# Patient Record
Sex: FEMALE | Race: WHITE | NOT HISPANIC OR LATINO | Employment: PART TIME | URBAN - METROPOLITAN AREA
[De-identification: names, ages, dates, MRNs, and addresses within clinical notes are randomized per-mention and may not be internally consistent; named-entity substitution may affect disease eponyms.]

---

## 2018-03-23 ENCOUNTER — HOSPITAL ENCOUNTER (OUTPATIENT)
Facility: MEDICAL CENTER | Age: 58
End: 2018-03-23
Attending: SPECIALIST
Payer: COMMERCIAL

## 2018-03-23 PROCEDURE — 88305 TISSUE EXAM BY PATHOLOGIST: CPT

## 2018-08-14 ENCOUNTER — OFFICE VISIT (OUTPATIENT)
Dept: INTERNAL MEDICINE | Facility: MEDICAL CENTER | Age: 58
End: 2018-08-14
Payer: COMMERCIAL

## 2018-08-14 VITALS
BODY MASS INDEX: 28.66 KG/M2 | HEART RATE: 83 BPM | DIASTOLIC BLOOD PRESSURE: 86 MMHG | OXYGEN SATURATION: 94 % | TEMPERATURE: 96.9 F | WEIGHT: 172 LBS | HEIGHT: 65 IN | SYSTOLIC BLOOD PRESSURE: 152 MMHG

## 2018-08-14 DIAGNOSIS — E78.5 DYSLIPIDEMIA: ICD-10-CM

## 2018-08-14 DIAGNOSIS — M54.41 CHRONIC MIDLINE LOW BACK PAIN WITH RIGHT-SIDED SCIATICA: ICD-10-CM

## 2018-08-14 DIAGNOSIS — Z00.00 ENCOUNTER FOR PREVENTIVE CARE: ICD-10-CM

## 2018-08-14 DIAGNOSIS — R20.2 NUMBNESS AND TINGLING IN BOTH HANDS: ICD-10-CM

## 2018-08-14 DIAGNOSIS — Z13.1 SCREENING FOR DIABETES MELLITUS (DM): ICD-10-CM

## 2018-08-14 DIAGNOSIS — G89.29 CHRONIC MIDLINE LOW BACK PAIN WITH RIGHT-SIDED SCIATICA: ICD-10-CM

## 2018-08-14 DIAGNOSIS — F32.A DEPRESSION, UNSPECIFIED DEPRESSION TYPE: ICD-10-CM

## 2018-08-14 DIAGNOSIS — R20.0 NUMBNESS AND TINGLING IN BOTH HANDS: ICD-10-CM

## 2018-08-14 DIAGNOSIS — Z13.29 SCREENING FOR THYROID DISORDER: ICD-10-CM

## 2018-08-14 DIAGNOSIS — Z71.41 ENCOUNTER FOR ALCOHOLISM COUNSELING: ICD-10-CM

## 2018-08-14 DIAGNOSIS — Z71.6 ENCOUNTER FOR SMOKING CESSATION COUNSELING: ICD-10-CM

## 2018-08-14 PROCEDURE — 99204 OFFICE O/P NEW MOD 45 MIN: CPT | Mod: GC | Performed by: INTERNAL MEDICINE

## 2018-08-14 RX ORDER — FLUOXETINE HYDROCHLORIDE 20 MG/1
20 CAPSULE ORAL DAILY
COMMUNITY
End: 2019-01-22

## 2018-08-14 RX ORDER — NAPROXEN 500 MG/1
500 TABLET ORAL 2 TIMES DAILY WITH MEALS
Qty: 60 TAB | Refills: 3 | Status: SHIPPED | OUTPATIENT
Start: 2018-08-14 | End: 2018-08-14 | Stop reason: SDUPTHER

## 2018-08-14 RX ORDER — NAPROXEN 500 MG/1
500 TABLET ORAL 2 TIMES DAILY WITH MEALS
Qty: 60 TAB | Refills: 3 | Status: SHIPPED | OUTPATIENT
Start: 2018-08-14 | End: 2019-01-22

## 2018-08-14 NOTE — LETTER
[facility]  Randy Elaine M.D.  [address]  [place] [address]  Fax: [phone]   Authorization for Release/Disclosure of   Protected Health Information   Name: GITA GRAVES : 1960 SSN: [id number]   Address: [address] [place] [id number] Phone:    [phone] (home)    I authorize the entity listed below to release/disclose the PHI below to:   [facility]/Randy Elaine M.D. and Randy Elaine M.D.   Provider or Entity Name:                                           Dr. Heather Hutchinson - [facility]     Address   [facility], [facility], [facility]   Phone:      Fax:     Reason for request: continuity of care   Information to be released:    [  ] LAST COLONOSCOPY,  including any PATH REPORT and follow-up  [  ] LAST FIT/COLOGUARD RESULT [  ] LAST DEXA  [  ] LAST MAMMOGRAM  [  ] LAST PAP  [  ] LAST LABS [  ] RETINA EXAM REPORT  [  ] IMMUNIZATION RECORDS  [X] Release all info      [  ] Check here and initial the line next to each item to release ALL health information INCLUDING  _____ Care and treatment for drug and / or alcohol abuse  _____ HIV testing, infection status, or AIDS  _____ Genetic Testing    DATES OF SERVICE OR TIME PERIOD TO BE DISCLOSED: _____________  I understand and acknowledge that:  * This Authorization may be revoked at any time by you in writing, except if your health information has already been used or disclosed.  * Your health information that will be used or disclosed as a result of you signing this authorization could be re-disclosed by the recipient. If this occurs, your re-disclosed health information may no longer be protected by State or Federal laws.  * You may refuse to sign this Authorization. Your refusal will not affect your ability to obtain treatment.  * This Authorization becomes effective upon signing and will  on (date) __________.      If no date is indicated, this Authorization will  one (1) year from the signature date.    Name: Gita  Demar Pack    Signature:   Date:     8/14/2018       PLEASE FAX REQUESTED RECORDS BACK TO: [phone]

## 2018-08-14 NOTE — PATIENT INSTRUCTIONS
Please get your labwork done  Please get all Xray imaging done  Please get mammogram done  Please schedule appointment with [facility] as needed  You can take Naproxen 500mg 2 times daily as needed for pain control      Dyslipidemia  Dyslipidemia is an imbalance of waxy, fat-like substances (lipids) in the blood. The body needs lipids in small amounts. Dyslipidemia often involves a high level of cholesterol or triglycerides, which are types of lipids.  Common forms of dyslipidemia include:  · High levels of bad cholesterol (LDL cholesterol). LDL is the type of cholesterol that causes fatty deposits (plaques) to build up in the blood vessels that carry blood away from your heart (arteries).  · Low levels of good cholesterol (HDL cholesterol). HDL cholesterol is the type of cholesterol that protects against heart disease. High levels of HDL remove the LDL buildup from arteries.  · High levels of triglycerides. Triglycerides are a fatty substance in the blood that is linked to a buildup of plaques in the arteries.  You can develop dyslipidemia because of the genes you are born with (primary dyslipidemia) or changes that occur during your life (secondary dyslipidemia), or as a side effect of certain medical treatments.  What are the causes?  Primary dyslipidemia is caused by changes (mutations) in genes that are passed down through families (inherited). These mutations cause several types of dyslipidemia. Mutations can result in disorders that make the body produce too much LDL cholesterol or triglycerides, or not enough HDL cholesterol. These disorders may lead to heart disease, arterial disease, or stroke at an early age.  Causes of secondary dyslipidemia include certain lifestyle choices and diseases that lead to dyslipidemia, such as:  · Eating a diet that is high in animal fat.  · Not getting enough activity or exercise (having a sedentary lifestyle).  · Having diabetes, kidney disease, liver disease, or  thyroid disease.  · Drinking large amounts of alcohol.  · Using certain types of drugs.  What increases the risk?  You may be at greater risk for dyslipidemia if you are an older man or if you are a woman who has gone through menopause. Other risk factors include:  · Having a family history of dyslipidemia.  · Taking certain medicines, including birth control pills, steroids, some diuretics, beta-blockers, and some medicines for HIV.  · Smoking cigarettes.  · Eating a high-fat diet.  · Drinking large amounts of alcohol.  · Having certain medical conditions such as diabetes, polycystic ovary syndrome (PCOS), pregnancy, kidney disease, liver disease, or hypothyroidism.  · Not exercising regularly.  · Being overweight or obese with too much belly fat.  What are the signs or symptoms?  Dyslipidemia does not usually cause any symptoms.  Very high lipid levels can cause fatty bumps under the skin (xanthomas) or a white or gray ring around the black center (pupil) of the eye. Very high triglyceride levels can cause inflammation of the pancreas (pancreatitis).  How is this diagnosed?  Your health care provider may diagnose dyslipidemia based on a routine blood test (fasting blood test). Because most people do not have symptoms of the condition, this blood testing (lipid profile) is done on adults age 20 and older and is repeated every 5 years. This test checks:  · Total cholesterol. This is a measure of the total amount of cholesterol in your blood, including LDL cholesterol, HDL cholesterol, and triglycerides. A healthy number is below 200.  · LDL cholesterol. The target number for LDL cholesterol is different for each person, depending on individual risk factors. For most people, a number below 100 is healthy. Ask your health care provider what your LDL cholesterol number should be.  · HDL cholesterol. An HDL level of 60 or higher is best because it helps to protect against heart disease. A number below 40 for men or  below 50 for women increases the risk for heart disease.  · Triglycerides. A healthy triglyceride number is below 150.  If your lipid profile is abnormal, your health care provider may do other blood tests to get more information about your condition.  How is this treated?  Treatment depends on the type of dyslipidemia that you have and your other risk factors for heart disease and stroke. Your health care provider will have a target range for your lipid levels based on this information.  For many people, treatment starts with lifestyle changes, such as diet and exercise. Your health care provider may recommend that you:  · Get regular exercise.  · Make changes to your diet.  · Quit smoking if you smoke.  If diet changes and exercise do not help you reach your goals, your health care provider may also prescribe medicine to lower lipids. The most commonly prescribed type of medicine lowers your LDL cholesterol (statin drug). If you have a high triglyceride level, your provider may prescribe another type of drug (fibrate) or an omega-3 fish oil supplement, or both.  Follow these instructions at home:  · Take over-the-counter and prescription medicines only as told by your health care provider. This includes supplements.  · Get regular exercise. Start an aerobic exercise and strength training program as told by your health care provider. Ask your health care provider what activities are safe for you. Your health care provider may recommend:  ¨ 30 minutes of aerobic activity 4-6 days a week. Brisk walking is an example of aerobic activity.  ¨ Strength training 2 days a week.  · Eat a healthy diet as told by your health care provider. This can help you reach and maintain a healthy weight, lower your LDL cholesterol, and raise your HDL cholesterol. It may help to work with a diet and nutrition specialist (dietitian) to make a plan that is right for you. Your dietitian or health care provider may recommend:  ¨ Limiting  your calories, if you are overweight.  ¨ Eating more fruits, vegetables, whole grains, fish, and lean meats.  ¨ Limiting saturated fat, trans fat, and cholesterol.  · Follow instructions from your health care provider or dietitian about eating or drinking restrictions.  · Limit alcohol intake to no more than one drink per day for nonpregnant women and two drinks per day for men. One drink equals 12 oz of beer, 5 oz of wine, or 1½ oz of hard liquor.  · Do not use any products that contain nicotine or tobacco, such as cigarettes and e-cigarettes. If you need help quitting, ask your health care provider.  · Keep all follow-up visits as told by your health care provider. This is important.  Contact a health care provider if:  · You are having trouble sticking to your exercise or diet plan.  · You are struggling to quit smoking or control your use of alcohol.  Summary  · Dyslipidemia is an imbalance of waxy, fat-like substances (lipids) in the blood. The body needs lipids in small amounts. Dyslipidemia often involves a high level of cholesterol or triglycerides, which are types of lipids.  · Treatment depends on the type of dyslipidemia that you have and your other risk factors for heart disease and stroke.  · For many people, treatment starts with lifestyle changes, such as diet and exercise. Your health care provider may also prescribe medicine to lower lipids.  This information is not intended to replace advice given to you by your health care provider. Make sure you discuss any questions you have with your health care provider.  Document Released: 12/23/2014 Document Revised: 08/14/2017 Document Reviewed: 08/14/2017  [employer] Interactive Patient Education © 2017 [employer]    Alcohol Problems  Most adults who drink alcohol drink in moderation (not a lot) are at low risk for developing problems related to their drinking. However, all drinkers, including low-risk drinkers, should know about the health risks  connected with drinking alcohol.  RECOMMENDATIONS FOR LOW-RISK DRINKING   Drink in moderation. Moderate drinking is defined as follows:   · Men - no more than 2 drinks per day.  · Nonpregnant women - no more than 1 drink per day.  · Over age 65 - no more than 1 drink per day.  A standard drink is 12 grams of pure alcohol, which is equal to a 12 ounce bottle of beer or wine cooler, a 5 ounce glass of wine, or 1.5 ounces of distilled spirits (such as whiskey, gopal, vodka, or rum).   ABSTAIN FROM (DO NOT DRINK) ALCOHOL:  · When pregnant or considering pregnancy.  · When taking a medication that interacts with alcohol.  · If you are alcohol dependent.  · A medical condition that prohibits drinking alcohol (such as ulcer, liver disease, or heart disease).  DISCUSS WITH YOUR CAREGIVER:  · If you are at risk for coronary heart disease, discuss the potential benefits and risks of alcohol use: Light to moderate drinking is associated with lower rates of coronary heart disease in certain populations (for example, men over age 45 and postmenopausal women). Infrequent or nondrinkers are advised not to begin light to moderate drinking to reduce the risk of coronary heart disease so as to avoid creating an alcohol-related problem. Similar protective effects can likely be gained through proper diet and exercise.  · Women and the elderly have smaller amounts of body water than men. As a result women and the elderly achieve a higher blood alcohol concentration after drinking the same amount of alcohol.  · Exposing a fetus to alcohol can cause a broad range of birth defects referred to as Fetal Alcohol Syndrome (FAS) or Alcohol-Related Birth Defects (ARBD). Although FAS/ARBD is connected with excessive alcohol consumption during pregnancy, studies also have reported neurobehavioral problems in infants born to mothers reporting drinking an average of 1 drink per day during pregnancy.  · Heavier drinking (the consumption of more than  4 drinks per occasion by men and more than 3 drinks per occasion by women) impairs learning (cognitive) and psychomotor functions and increases the risk of alcohol-related problems, including accidents and injuries.  CAGE QUESTIONS:   · Have you ever felt that you should Cut down on your drinking?  · Have people Annoyed you by criticizing your drinking?  · Have you ever felt bad or Guilty about your drinking?  · Have you ever had a drink first thing in the morning to steady your nerves or get rid of a hangover (Eye opener)?  If you answered positively to any of these questions: You may be at risk for alcohol-related problems if alcohol consumption is:   · Men: Greater than 14 drinks per week or more than 4 drinks per occasion.  · Women: Greater than 7 drinks per week or more than 3 drinks per occasion.  Do you or your family have a medical history of alcohol-related problems, such as:  · Blackouts.  · Sexual dysfunction.  · Depression.  · Trauma.  · Liver dysfunction.  · Sleep disorders.  · Hypertension.  · Chronic abdominal pain.  · Has your drinking ever caused you problems, such as problems with your family, problems with your work (or school) performance, or accidents/injuries?  · Do you have a compulsion to drink or a preoccupation with drinking?  · Do you have poor control or are you unable to stop drinking once you have started?  · Do you have to drink to avoid withdrawal symptoms?  · Do you have problems with withdrawal such as tremors, nausea, sweats, or mood disturbances?  · Does it take more alcohol than in the past to get you high?  · Do you feel a strong urge to drink?  · Do you change your plans so that you can have a drink?  · Do you ever drink in the morning to relieve the shakes or a hangover?  If you have answered a number of the previous questions positively, it may be time for you to talk to your caregivers, family, and friends and see if they think you have a problem. Alcoholism is a chemical  dependency that keeps getting worse and will eventually destroy your health and relationships. Many alcoholics end up dead, impoverished, or in residential. This is often the end result of all chemical dependency.  · Do not be discouraged if you are not ready to take action immediately.  · Decisions to change behavior often involve up and down desires to change and feeling like you cannot decide.  · Try to think more seriously about your drinking behavior.  · Think of the reasons to quit.  WHERE TO GO FOR ADDITIONAL INFORMATION   · The National [place] on Alcohol Abuse and Alcoholism (NIAAA)  www.niaaa.nih.gov  · National [place] on Alcoholism and Drug Dependence (NCADD)  www.ncadd.org  · American Society of Addiction Medicine (ASAM)  www.asam.org   Document Released: 12/18/2006 Document Revised: 03/11/2013 Document Reviewed: 08/05/2009  [employer]® Patient Information ©2014 [employer].      Steps to Quit Smoking  Smoking tobacco can be bad for your health. It can also affect almost every organ in your body. Smoking puts you and people around you at risk for many serious long-lasting (chronic) diseases. Quitting smoking is hard, but it is one of the best things that you can do for your health. It is never too late to quit.  What are the benefits of quitting smoking?  When you quit smoking, you lower your risk for getting serious diseases and conditions. They can include:  · Lung cancer or lung disease.  · Heart disease.  · Stroke.  · Heart attack.  · Not being able to have children (infertility).  · Weak bones (osteoporosis) and broken bones (fractures).  If you have coughing, wheezing, and shortness of breath, those symptoms may get better when you quit. You may also get sick less often. If you are pregnant, quitting smoking can help to lower your chances of having a baby of low birth weight.  What can I do to help me quit smoking?  Talk with your doctor about what can help you quit smoking. Some things you can do  (strategies) include:  · Quitting smoking totally, instead of slowly cutting back how much you smoke over a period of time.  · Going to in-person counseling. You are more likely to quit if you go to many counseling sessions.  · Using resources and support systems, such as:  ¨ Online chats with a counselor.  ¨ Phone quitlines.  ¨ Printed self-help materials.  ¨ Support groups or group counseling.  ¨ Text messaging programs.  ¨ Mobile phone apps or applications.  · Taking medicines. Some of these medicines may have nicotine in them. If you are pregnant or breastfeeding, do not take any medicines to quit smoking unless your doctor says it is okay. Talk with your doctor about counseling or other things that can help you.  Talk with your doctor about using more than one strategy at the same time, such as taking medicines while you are also going to in-person counseling. This can help make quitting easier.  What things can I do to make it easier to quit?  Quitting smoking might feel very hard at first, but there is a lot that you can do to make it easier. Take these steps:  · Talk to your family and friends. Ask them to support and encourage you.  · Call phone quitlines, reach out to support groups, or work with a counselor.  · Ask people who smoke to not smoke around you.  · Avoid places that make you want (trigger) to smoke, such as:  ¨ Bars.  ¨ Parties.  ¨ Smoke-break areas at work.  · Spend time with people who do not smoke.  · Lower the stress in your life. Stress can make you want to smoke. Try these things to help your stress:  ¨ Getting regular exercise.  ¨ Deep-breathing exercises.  ¨ Yoga.  ¨ Meditating.  ¨ Doing a body scan. To do this, close your eyes, focus on one area of your body at a time from head to toe, and notice which parts of your body are tense. Try to relax the muscles in those areas.  · Download or buy apps on your mobile phone or tablet that can help you stick to your quit plan. There are many  free apps, such as QuitGuide from the CDC (Centers for Disease Control and Prevention). You can find more support from smokefree.gov and other websites.  This information is not intended to replace advice given to you by your health care provider. Make sure you discuss any questions you have with your health care provider.  Document Released: 10/14/2010 Document Revised: 08/15/2017 Document Reviewed: 05/03/2016  [employer] Interactive Patient Education © 2017 [employer]

## 2018-08-15 NOTE — PROGRESS NOTES
New Patient to Establish    Reason to establish: New patient to establish    CC:   Patient is here to establish care with new primary care provider  She would also like to discuss her lower back pain    HPI:   Patient is a 57-year-old female who arrives to establish care with new primary care provider, and also discuss her lower back pain and numbness and tingling in her hands.  Patient states that she has had lower back pain for 4 years and states that the pain is located in her lower back and hips and started out of nowhere.  Patient states that this pain might be due to her overzealous work ethic.  Patient describes electricity like pain into her right leg especially when she stands and states that this is new for the past 3 months.  Patient states that more often this electricity like pain is shooting down from her right knee.  Patient states that she has been taking ibuprofen 800 mg every other day for her back pain at this time.      Patient also states numbness and tingling in her fingers (right hand first 3 fingers) and sometimes in both hands and arms.  Patient states that this is been going on for 6 months now    She states that she is not good at taking pills and has not been compliant with her hyperlipidemia medications simvastatin since 2014 and also states that she is not good with her diet.  She states that last year she was prescribed Prozac for her depression which she has not been taking.    In terms of social history patient states that she smokes 1 pack per day since age 30, denies any illicit drug use, and states that she drinks 3 drinks which are mixed with vodka daily.  Terms of cage questioning patient states she does want to cut back, others do not feel annoyed or she does not feel annoyed from criticism, she does feel guilty, and does not require an eye opener. So 2 out of 4 cage questions were positive.  Patient states that in terms of sexual history, she is not currently sexually active,  and that 2 years ago there were some growths observed in her genital area and since then she has been seeing gynecology every 4 months.  She cannot recall being tested for HIV.  In terms her OB/GYN history, patient states she had a hysterectomy with cervix removed, and her last mammogram was 2013.  In terms of preventive screening, patient states her last colonoscopy was in 2010, her last Tdap was in June 2018.      There are no active problems to display for this patient.      Past Medical History:   Diagnosis Date   • Hypercholesterolemia        Current Outpatient Prescriptions   Medication Sig Dispense Refill   • FLUoxetine (PROZAC) 20 MG Cap Take 20 mg by mouth every day.     • nicotine polacrilex (NICORETTE) 2 MG Gum Take 1 Each by mouth as needed for Smoking Cessation. 30 Each 3   • naproxen (NAPROSYN) 500 MG Tab Take 1 Tab by mouth 2 times a day, with meals. 60 Tab 3   • simvastatin (ZOCOR) 40 MG TABS Take 20 mg by mouth every evening.     • cyclobenzaprine (FLEXERIL) 10 MG TABS Take 1 Tab by mouth 3 times a day as needed for Mild Pain. 30 Tab 0     No current facility-administered medications for this visit.        Allergies as of 08/14/2018   • (No Known Allergies)       Social History     Social History   • Marital status:      Spouse name: N/A   • Number of children: N/A   • Years of education: N/A     Occupational History   • Not on file.     Social History Main Topics   • Smoking status: Current Every Day Smoker     Packs/day: 1.00     Years: 40.00     Types: Cigarettes   • Smokeless tobacco: Never Used   • Alcohol use Yes      Comment: 3 drinks (mixed drinks with vodka)/day in evening    • Drug use: No   • Sexual activity: No     Other Topics Concern   • Not on file     Social History Narrative   • No narrative on file       Family History   Problem Relation Age of Onset   • Hyperlipidemia Mother    • Diabetes Mother    • Diabetes Father    • Hyperlipidemia Father    • Heart Disease Father    •  "Cancer Maternal Grandmother    • Hyperlipidemia Maternal Grandmother        Past Surgical History:   Procedure Laterality Date   • ABDOMINAL HYSTERECTOMY TOTAL  5/29/08    Performed by JAHAIRA HOLLINGSWORTH at SURGERY SAME DAY [facility] ORS   • CYSTOSCOPY  5/29/08    Performed by JAHAIRA HOLLINGSWORTH at SURGERY SAME DAY [facility] ORS   • BREAST BIOPSY      hx of right benign breast biopsy       ROS: As per HPI. Additional pertinent symptoms as noted below.    Patient states a cough but due to smoking and allergies sometimes productive with white yellow sputum  All others negative    /86   Pulse 83   Temp 36.1 °C (96.9 °F)   Ht 1.651 m (5' 5\")   Wt 78 kg (172 lb)   SpO2 94%   BMI 28.62 kg/m²     Physical Exam  General:  Alert and oriented, No apparent distress.    Eyes: Pupils equal and reactive. No scleral icterus.    Throat: Clear no erythema or exudates noted.    Neck: Supple. No lymphadenopathy noted. Thyroid not enlarged.    Lungs: Clear to auscultation and percussion bilaterally.    Cardiovascular: Regular rate and rhythm. No murmurs, rubs or gallops.    Abdomen:  Benign. No rebound or guarding noted.    Extremities: No clubbing, cyanosis, edema.  2+ bilateral distal pulses bilaterally.    Skin: Clear. No rash or suspicious skin lesions noted.    Musculoskeletal: Lower lumbar L4-L5 nontender to palpation, straight leg raise test bilaterally negative, gait observed was normal, sensation intact bilaterally in upper and lower extremities.       Assessment and Plan    1. Chronic midline low back pain with right-sided sciatica  Lower back pain for 4 years per patient.  This is likely degenerative arthritis/osteoarthritis versus myelopathic versus radicular involvement presentation; degenerative arthritis/osteoarthritis more likely  Patient states he goes to chiropractor for her back and this helps  -X-ray of the cervical spine, and x-ray of the thoracic spine, and x-ray of the lumbar spine ordered  -Will follow-up at " next office visit and based on x-ray results may refer to physical therapy if necessary  -Patient prescribed Naproxen 500mg 2 times daily as needed for pain control  -We will consider adding extra strength acetaminophen at next visit based on the left E lab work results      2. Numbness and tingling in both hands  Numbness and tingling in fingers of both hands for past 6 months  This is likely secondary to possible nerve impingement in cervical spine versus undiagnosed diabetic symptoms  -X-ray of the cervical spine, and x-ray of the thoracic spine, and x-ray of the lumbar spine ordered  -Will follow-up at next office visit      3. Dyslipidemia  Patient was prescribed simvastatin for hyperlipidemia in the past but has not taken this medication since 2014 and also states her diet is very poor  -Lab work lipid panel ordered  -Will follow up at next office visit      4. Depression, unspecified depression type  Patient states that she was prescribed Prozac for depression in the past but not currently taking  -PHQ 9 was performed in the clinic and patient scored a 6  -No further intervention at this time will continue to monitor      5. Encounter for smoking cessation counseling  Patient verbalizes interest to quit smoking and was counseled on cessation  -Patient agrees to try nicotine gum  -Patient was also provided cessation materials      6. Encounter for alcoholism counseling  Patient worse reports extensive alcohol use 3 drinks (mixed with vodka) per day  -Patient was counseled regarding alcohol cessation  -Patient verbalizes need to quit back and is making efforts to cut back  -Labwork CMP ordered to check LFTs  -Will follow up at next office visit in continue to monitor      7. Encounter for preventive care  Patient reports never receiving a mammogram  -Patient provided order for mammogram  -HIV antigen antibody screen ordered      8. Screening for diabetes mellitus (DM)  Patient does not have any recent baseline  studies  -Labwork fasting CMP ordered  -Will follow up at next office visit      9. Screening for thyroid disorder  Patient does not have any recent baseline studies  -Labwork TSH reflex to T4 ordered  -Will follow up at next office visit          Signed by: Randy Elaine M.D.

## 2018-08-21 ENCOUNTER — HOSPITAL ENCOUNTER (OUTPATIENT)
Dept: RADIOLOGY | Facility: MEDICAL CENTER | Age: 58
End: 2018-08-21
Attending: STUDENT IN AN ORGANIZED HEALTH CARE EDUCATION/TRAINING PROGRAM
Payer: COMMERCIAL

## 2018-08-21 ENCOUNTER — HOSPITAL ENCOUNTER (OUTPATIENT)
Dept: LAB | Facility: MEDICAL CENTER | Age: 58
End: 2018-08-21
Attending: STUDENT IN AN ORGANIZED HEALTH CARE EDUCATION/TRAINING PROGRAM
Payer: COMMERCIAL

## 2018-08-21 DIAGNOSIS — E78.5 DYSLIPIDEMIA: ICD-10-CM

## 2018-08-21 DIAGNOSIS — R20.0 NUMBNESS AND TINGLING IN BOTH HANDS: ICD-10-CM

## 2018-08-21 DIAGNOSIS — Z13.1 SCREENING FOR DIABETES MELLITUS (DM): ICD-10-CM

## 2018-08-21 DIAGNOSIS — R20.2 NUMBNESS AND TINGLING IN BOTH HANDS: ICD-10-CM

## 2018-08-21 DIAGNOSIS — Z13.29 SCREENING FOR THYROID DISORDER: ICD-10-CM

## 2018-08-21 DIAGNOSIS — Z00.00 ENCOUNTER FOR PREVENTIVE CARE: ICD-10-CM

## 2018-08-21 DIAGNOSIS — G89.29 CHRONIC MIDLINE LOW BACK PAIN WITH RIGHT-SIDED SCIATICA: ICD-10-CM

## 2018-08-21 DIAGNOSIS — M54.41 CHRONIC MIDLINE LOW BACK PAIN WITH RIGHT-SIDED SCIATICA: ICD-10-CM

## 2018-08-21 LAB
ALBUMIN SERPL BCP-MCNC: 4.7 G/DL (ref 3.2–4.9)
ALBUMIN/GLOB SERPL: 1.5 G/DL
ALP SERPL-CCNC: 87 U/L (ref 30–99)
ALT SERPL-CCNC: 49 U/L (ref 2–50)
ANION GAP SERPL CALC-SCNC: 8 MMOL/L (ref 0–11.9)
AST SERPL-CCNC: 44 U/L (ref 12–45)
BASOPHILS # BLD AUTO: 0.6 % (ref 0–1.8)
BASOPHILS # BLD: 0.05 K/UL (ref 0–0.12)
BILIRUB SERPL-MCNC: 0.5 MG/DL (ref 0.1–1.5)
BUN SERPL-MCNC: 16 MG/DL (ref 8–22)
CALCIUM SERPL-MCNC: 10 MG/DL (ref 8.5–10.5)
CHLORIDE SERPL-SCNC: 105 MMOL/L (ref 96–112)
CHOLEST SERPL-MCNC: 274 MG/DL (ref 100–199)
CO2 SERPL-SCNC: 28 MMOL/L (ref 20–33)
CREAT SERPL-MCNC: 0.63 MG/DL (ref 0.5–1.4)
EOSINOPHIL # BLD AUTO: 0.09 K/UL (ref 0–0.51)
EOSINOPHIL NFR BLD: 1.1 % (ref 0–6.9)
ERYTHROCYTE [DISTWIDTH] IN BLOOD BY AUTOMATED COUNT: 44 FL (ref 35.9–50)
GLOBULIN SER CALC-MCNC: 3.1 G/DL (ref 1.9–3.5)
GLUCOSE SERPL-MCNC: 110 MG/DL (ref 65–99)
HCT VFR BLD AUTO: 50.2 % (ref 37–47)
HDLC SERPL-MCNC: 81 MG/DL
HGB BLD-MCNC: 17.4 G/DL (ref 12–16)
HIV 1+2 AB+HIV1 P24 AG SERPL QL IA: NON REACTIVE
IMM GRANULOCYTES # BLD AUTO: 0.03 K/UL (ref 0–0.11)
IMM GRANULOCYTES NFR BLD AUTO: 0.4 % (ref 0–0.9)
LDLC SERPL CALC-MCNC: 173 MG/DL
LYMPHOCYTES # BLD AUTO: 2.41 K/UL (ref 1–4.8)
LYMPHOCYTES NFR BLD: 29.8 % (ref 22–41)
MCH RBC QN AUTO: 32.6 PG (ref 27–33)
MCHC RBC AUTO-ENTMCNC: 34.7 G/DL (ref 33.6–35)
MCV RBC AUTO: 94 FL (ref 81.4–97.8)
MONOCYTES # BLD AUTO: 0.53 K/UL (ref 0–0.85)
MONOCYTES NFR BLD AUTO: 6.5 % (ref 0–13.4)
NEUTROPHILS # BLD AUTO: 4.99 K/UL (ref 2–7.15)
NEUTROPHILS NFR BLD: 61.6 % (ref 44–72)
NRBC # BLD AUTO: 0 K/UL
NRBC BLD-RTO: 0 /100 WBC
PLATELET # BLD AUTO: 214 K/UL (ref 164–446)
PMV BLD AUTO: 10.5 FL (ref 9–12.9)
POTASSIUM SERPL-SCNC: 4 MMOL/L (ref 3.6–5.5)
PROT SERPL-MCNC: 7.8 G/DL (ref 6–8.2)
RBC # BLD AUTO: 5.34 M/UL (ref 4.2–5.4)
SODIUM SERPL-SCNC: 141 MMOL/L (ref 135–145)
TRIGL SERPL-MCNC: 98 MG/DL (ref 0–149)
TSH SERPL DL<=0.005 MIU/L-ACNC: 1.64 UIU/ML (ref 0.38–5.33)
WBC # BLD AUTO: 8.1 K/UL (ref 4.8–10.8)

## 2018-08-21 PROCEDURE — 72072 X-RAY EXAM THORAC SPINE 3VWS: CPT

## 2018-08-21 PROCEDURE — 72040 X-RAY EXAM NECK SPINE 2-3 VW: CPT

## 2018-08-21 PROCEDURE — 85025 COMPLETE CBC W/AUTO DIFF WBC: CPT

## 2018-08-21 PROCEDURE — 87389 HIV-1 AG W/HIV-1&-2 AB AG IA: CPT

## 2018-08-21 PROCEDURE — 80053 COMPREHEN METABOLIC PANEL: CPT

## 2018-08-21 PROCEDURE — 80061 LIPID PANEL: CPT

## 2018-08-21 PROCEDURE — 84443 ASSAY THYROID STIM HORMONE: CPT

## 2018-08-21 PROCEDURE — 72100 X-RAY EXAM L-S SPINE 2/3 VWS: CPT

## 2018-08-21 PROCEDURE — 36415 COLL VENOUS BLD VENIPUNCTURE: CPT

## 2018-08-31 ENCOUNTER — HOSPITAL ENCOUNTER (OUTPATIENT)
Dept: RADIOLOGY | Facility: MEDICAL CENTER | Age: 58
End: 2018-08-31
Attending: STUDENT IN AN ORGANIZED HEALTH CARE EDUCATION/TRAINING PROGRAM
Payer: COMMERCIAL

## 2018-08-31 DIAGNOSIS — Z00.00 ENCOUNTER FOR PREVENTIVE CARE: ICD-10-CM

## 2018-08-31 PROCEDURE — 77067 SCR MAMMO BI INCL CAD: CPT

## 2019-01-22 ENCOUNTER — TELEPHONE (OUTPATIENT)
Dept: INTERNAL MEDICINE | Facility: MEDICAL CENTER | Age: 59
End: 2019-01-22

## 2019-01-22 ENCOUNTER — OFFICE VISIT (OUTPATIENT)
Dept: INTERNAL MEDICINE | Facility: MEDICAL CENTER | Age: 59
End: 2019-01-22
Payer: COMMERCIAL

## 2019-01-22 VITALS
TEMPERATURE: 97.3 F | WEIGHT: 183 LBS | OXYGEN SATURATION: 98 % | DIASTOLIC BLOOD PRESSURE: 92 MMHG | HEART RATE: 80 BPM | SYSTOLIC BLOOD PRESSURE: 142 MMHG | HEIGHT: 65 IN | BODY MASS INDEX: 30.49 KG/M2

## 2019-01-22 DIAGNOSIS — L02.429 CARBUNCLE AND FURUNCLE OF LOWER EXTREMITY: ICD-10-CM

## 2019-01-22 DIAGNOSIS — L02.439 CARBUNCLE AND FURUNCLE OF LOWER EXTREMITY: ICD-10-CM

## 2019-01-22 DIAGNOSIS — Z71.6 ENCOUNTER FOR SMOKING CESSATION COUNSELING: ICD-10-CM

## 2019-01-22 DIAGNOSIS — Z00.00 ENCOUNTER FOR PREVENTIVE CARE: ICD-10-CM

## 2019-01-22 DIAGNOSIS — E78.5 DYSLIPIDEMIA: ICD-10-CM

## 2019-01-22 PROCEDURE — 90732 PPSV23 VACC 2 YRS+ SUBQ/IM: CPT | Performed by: INTERNAL MEDICINE

## 2019-01-22 PROCEDURE — 90471 IMMUNIZATION ADMIN: CPT | Performed by: INTERNAL MEDICINE

## 2019-01-22 PROCEDURE — 99214 OFFICE O/P EST MOD 30 MIN: CPT | Mod: GC,25 | Performed by: INTERNAL MEDICINE

## 2019-01-22 RX ORDER — ATORVASTATIN CALCIUM 20 MG/1
40 TABLET, FILM COATED ORAL DAILY
Qty: 30 TAB | Refills: 3 | Status: SHIPPED | OUTPATIENT
Start: 2019-01-22 | End: 2020-10-20

## 2019-01-22 RX ORDER — SULFAMETHOXAZOLE AND TRIMETHOPRIM 800; 160 MG/1; MG/1
2 TABLET ORAL 2 TIMES DAILY
Qty: 28 TAB | Refills: 0 | Status: SHIPPED | OUTPATIENT
Start: 2019-01-22 | End: 2019-01-29

## 2019-01-22 RX ORDER — SULFAMETHOXAZOLE AND TRIMETHOPRIM 800; 160 MG/1; MG/1
1 TABLET ORAL 2 TIMES DAILY
Qty: 14 TAB | Refills: 0 | Status: SHIPPED | OUTPATIENT
Start: 2019-01-22 | End: 2019-01-22

## 2019-01-22 ASSESSMENT — PAIN SCALES - GENERAL: PAINLEVEL: 1=MINIMAL PAIN

## 2019-01-22 ASSESSMENT — PATIENT HEALTH QUESTIONNAIRE - PHQ9: CLINICAL INTERPRETATION OF PHQ2 SCORE: 0

## 2019-01-22 NOTE — NON-PROVIDER
"Gita Pack is a 58 y.o. female here for a non-provider visit for:   PNEUMOVAX (PPSV23)    Reason for immunization: Overdue/Provider Recommended  Immunization records indicate need for vaccine: Yes, confirmed with Epic  Minimum interval has been met for this vaccine: Yes  ABN completed: Not Indicated    Order and dose verified by: JAYLA  VIS Dated  4/24/15 was given to patient: Yes  All IAC Questionnaire questions were answered \"No.\"    Patient tolerated injection and no adverse effects were observed or reported: Yes    Pt scheduled for next dose in series: Not Indicated      "

## 2019-01-22 NOTE — PATIENT INSTRUCTIONS
- Please begin taking Bactrim double strength antibiotic two times daily for 7 days  - Use mupirocin topical antibiotic cream to apply to infected area two times daily after washing  - use moist/heat compresses to help with drainage  - If signs or symptoms worsen and you develop fevers or chills or worsening pain, then go to emergency department  - Start taking Atorvastatin 40 mg daily for high cholesterol  - Use [employer] surgical scrub as need for cleaning [email or web address]  - Follow-up appointment after one week after completing antibiotics     Abscess  Care After  An abscess (also called a boil or furuncle) is an infected area that contains a collection of pus. Signs and symptoms of an abscess include pain, tenderness, redness, or hardness, or you may feel a moveable soft area under your skin. An abscess can occur anywhere in the body. The infection may spread to surrounding tissues causing cellulitis. A cut (incision) by the surgeon was made over your abscess and the pus was drained out. Gauze may have been packed into the space to provide a drain that will allow the cavity to heal from the inside outwards. The boil may be painful for 5 to 7 days. Most people with a boil do not have high fevers. Your abscess, if seen early, may not have localized, and may not have been lanced. If not, another appointment may be required for this if it does not get better on its own or with medications.  HOME CARE INSTRUCTIONS   · Only take over-the-counter or prescription medicines for pain, discomfort, or fever as directed by your caregiver.  · When you bathe, soak and then remove gauze or iodoform packs at least daily or as directed by your caregiver. You may then wash the wound gently with mild soapy water. Repack with gauze or do as your caregiver directs.  SEEK IMMEDIATE MEDICAL CARE IF:   · You develop increased pain, swelling, redness, drainage, or bleeding in the wound site.  · You develop signs of  generalized infection including muscle aches, chills, fever, or a general ill feeling.  · An oral temperature above 102° F (38.9° C) develops, not controlled by medication.  See your caregiver for a recheck if you develop any of the symptoms described above. If medications (antibiotics) were prescribed, take them as directed.  Document Released: 07/06/2006 Document Revised: 03/11/2013 Document Reviewed: 03/02/2009  [employer]® Patient Information ©2014 [employer].

## 2019-01-22 NOTE — PROGRESS NOTES
Established Patient    Gita presents today with the following:    CC:   Patient presents to go over lab work and also discuss a right thigh carbuncle worsening for the past week    HPI:     Patient is a 58-year-old female who presents to [facility] clinic, to follow-up and go over her lab work and also discuss a right thigh carbuncle which has been worsening for the past week.      Patient's labs were discussed, lipid profile from 8/28/2018 revealed total cholesterol 274, triglycerides 98, HDL 81, , patient is a current smoker, her ASCVD risk was calculated to be 8.1% and a moderate to high intensity statin is recommended due to her risk factors at this time.    In terms of the right thigh carbuncle, patient states that otherwise this carbuncle has been hard and present for the past 8 months however recently in the past week it is opened and her skin appears to have infected, it was oozing with purulence, no bleeding, was tender when she touched the site, denies any fevers, chills, nausea, vomiting, diarrhea, constipation, any recent illnesses, any history of MRSA infection in the past.  She states that she has been using warm water compresses to help soften the skin about 2-3 times per day in addition to antibacterial soap and states that it is looking a lot better than it was before.    In terms of smoking cessation, patient states she is still smoking about 1 pack/day which she has been doing for the past 40 years and is willing to try and patch at this time to help with quitting.    In terms of preventive measures, patient consents to influenza vaccination at this time in addition to pneumonia vaccination Pneumovax PPSV 23, which is indicated for her because she is a current smoker.    There are no active problems to display for this patient.      Current Outpatient Prescriptions   Medication Sig Dispense Refill   • nicotine (NICODERM) 7 MG/24HR PATCH 24 HR Apply 1 Patch to skin as directed every 24 hours. 30  "Patch 3   • atorvastatin (LIPITOR) 20 MG Tab Take 2 Tabs by mouth every day. 30 Tab 3   • sulfamethoxazole-trimethoprim (BACTRIM DS) 800-160 MG tablet Take 1 Tab by mouth 2 times a day for 7 days. 14 Tab 0   • mupirocin (BACTROBAN) 2 % Ointment Apply 1 Application to affected area(s) 2 times a day. 1 Tube 1     No current facility-administered medications for this visit.        Social History     Social History   • Marital status:      Spouse name: N/A   • Number of children: N/A   • Years of education: N/A     Occupational History   • Not on file.     Social History Main Topics   • Smoking status: Current Every Day Smoker     Packs/day: 1.00     Years: 40.00     Types: Cigarettes   • Smokeless tobacco: Never Used   • Alcohol use Yes      Comment: 3 drinks (mixed drinks with vodka)/day in evening    • Drug use: No   • Sexual activity: No     Other Topics Concern   • Not on file     Social History Narrative   • No narrative on file       Family History   Problem Relation Age of Onset   • Hyperlipidemia Mother    • Diabetes Mother    • Diabetes Father    • Hyperlipidemia Father    • Heart Disease Father    • Cancer Maternal Grandmother    • Hyperlipidemia Maternal Grandmother        ROS: As per HPI.  All other systems reviewed and were negative.  Additional pertinent symptoms as noted below.    States that otherwise this carbuncle has been hard and present for the past 8 months however recently in the past week it is opened and her skin appears to have infected, it was oozing with purulence, no bleeding, was tender when she touched the site, denies any fevers, chills, nausea, vomiting, diarrhea, constipation, any recent illnesses, any history of MRSA infection in the past.   All others negative    /92 (BP Location: Left arm, Patient Position: Sitting, BP Cuff Size: Adult)   Pulse 80   Temp 36.3 °C (97.3 °F) (Temporal)   Ht 1.651 m (5' 5\")   Wt 83 kg (183 lb)   SpO2 98%   Breastfeeding? No   BMI " 30.45 kg/m²     Physical Exam  General:  Alert and oriented, No apparent distress.    Eyes: Pupils equal and reactive. No scleral icterus.    Throat: Clear no erythema or exudates noted.    Neck: Supple. No lymphadenopathy noted. Thyroid not enlarged.    Lungs: Clear to auscultation and percussion bilaterally.  Normal effort of breathing    Cardiovascular: Regular rate and rhythm. No murmurs, rubs or gallops.    Abdomen:  Benign. No rebound or guarding noted.    Extremities: No clubbing, cyanosis, edema.  Distal peripheral pulses 2+ bilaterally in all extremities.    Skin: Clear. No rash or suspicious skin lesions noted.  On right lower extremity, on right thigh anterior a carbuncle which is raised and indurated with open wound site, purulent drainage appreciated, tender to palpation, erythematous around wound site, no lymphatic streaking visualized at this time.        Assessment and Plan    1. Carbuncle and furuncle of lower extremity  -  Right thigh carbuncle which has been worsening for the past week  - On physical examination, on right lower extremity, on right thigh anterior a carbuncle which is raised and indurated with open wound site, purulent drainage appreciated, tender to palpation, erythematous around wound site, no lymphatic streaking visualized at this time.  - Patient's clinical presentation is suspicious for MRSA infection at this time, despite being afebrile no fevers or chills, important to address this as MRSA infection at this time  - CULTURE WOUND W/ GRAM STAIN  - CULTURE WOUND W/ GRAM STAIN  - To be started on Bactrim double strength antibiotics twice daily for 7 days  - Given mupirocin topical antibiotic cream to apply to area 2 times daily after washing  - Urgent referral to wound clinic for further workup and evaluation and debridement if necessary  - To utilize warm compresses and sterile cleansing methods such as Hibiclens provided information and wrap  - Will follow-up wound culture for  culture and sensitivity results and titrate antibiotic if necessary  - To follow-up after 1 week of completing antibiotic      2. Dyslipidemia  - Recent labs were discussed, lipid profile from 8/28/2018 revealed total cholesterol 274, triglycerides 98, HDL 81,   - Patient is a current smoker  - ASCVD 10 year risk was calculated to be 8.1% and a moderate to high intensity statin is recommended due to her risk factors at this time  -She is to be started on atorvastatin 40 mg daily at this time  - Will continue to monitor      3. Encounter for smoking cessation counseling  - States she is still smoking about 1 pack/day which she has been doing for the past 40 years and is willing to try and patch at this time to help with quitting  - Nicotine patch provided to patient for smoking cessation  - Will continue to monitor      4. Encounter for preventive care  -  Patient consents to influenza vaccination at this time in addition to pneumonia vaccination Pneumovax PPSV 23, which is indicated for her because she is a current smoker  - Provided influenza vaccination and Pneumovax vaccination PPSV 23 at office visit today      Signed by: Randy Elaine M.D.

## 2019-01-22 NOTE — TELEPHONE ENCOUNTER
Spoke to patient over the phone around 1 PM on 1/2/2019 regarding Bactrim antibiotic to take double strength 2 tablets twice daily for next 7 days (that new order has been sent to pharmacy), patient expressed verbal understanding states she will pick this up today.  She was informed that if her signs and symptoms worsen or she develops any fevers or chills to go to the emergency department as this can be signs of worsening infection.

## 2019-01-31 ENCOUNTER — OFFICE VISIT (OUTPATIENT)
Dept: INTERNAL MEDICINE | Facility: MEDICAL CENTER | Age: 59
End: 2019-01-31
Payer: COMMERCIAL

## 2019-01-31 VITALS
SYSTOLIC BLOOD PRESSURE: 128 MMHG | BODY MASS INDEX: 30.16 KG/M2 | OXYGEN SATURATION: 98 % | HEIGHT: 65 IN | WEIGHT: 181 LBS | TEMPERATURE: 98.5 F | DIASTOLIC BLOOD PRESSURE: 78 MMHG | HEART RATE: 94 BPM

## 2019-01-31 DIAGNOSIS — M54.2 NECK PAIN: ICD-10-CM

## 2019-01-31 DIAGNOSIS — L02.429 CARBUNCLE AND FURUNCLE OF LEG: ICD-10-CM

## 2019-01-31 DIAGNOSIS — L02.439 CARBUNCLE AND FURUNCLE OF LEG: ICD-10-CM

## 2019-01-31 DIAGNOSIS — Z23 NEED FOR VACCINATION: ICD-10-CM

## 2019-01-31 PROCEDURE — 90471 IMMUNIZATION ADMIN: CPT | Performed by: INTERNAL MEDICINE

## 2019-01-31 PROCEDURE — 90686 IIV4 VACC NO PRSV 0.5 ML IM: CPT | Performed by: INTERNAL MEDICINE

## 2019-01-31 PROCEDURE — 99213 OFFICE O/P EST LOW 20 MIN: CPT | Mod: GE,25 | Performed by: INTERNAL MEDICINE

## 2019-01-31 RX ORDER — ATORVASTATIN CALCIUM 40 MG/1
TABLET, FILM COATED ORAL
COMMUNITY
Start: 2019-01-22 | End: 2020-10-20

## 2019-01-31 RX ORDER — IBUPROFEN 600 MG/1
600 TABLET ORAL EVERY 6 HOURS PRN
Qty: 30 TAB | Refills: 0 | Status: SHIPPED | OUTPATIENT
Start: 2019-01-31 | End: 2020-10-19 | Stop reason: SDUPTHER

## 2019-01-31 NOTE — PATIENT INSTRUCTIONS
Abscess  Care After  An abscess (also called a boil or furuncle) is an infected area that contains a collection of pus. Signs and symptoms of an abscess include pain, tenderness, redness, or hardness, or you may feel a moveable soft area under your skin. An abscess can occur anywhere in the body. The infection may spread to surrounding tissues causing cellulitis. A cut (incision) by the surgeon was made over your abscess and the pus was drained out. Gauze may have been packed into the space to provide a drain that will allow the cavity to heal from the inside outwards. The boil may be painful for 5 to 7 days. Most people with a boil do not have high fevers. Your abscess, if seen early, may not have localized, and may not have been lanced. If not, another appointment may be required for this if it does not get better on its own or with medications.  HOME CARE INSTRUCTIONS   · Only take over-the-counter or prescription medicines for pain, discomfort, or fever as directed by your caregiver.  · When you bathe, soak and then remove gauze or iodoform packs at least daily or as directed by your caregiver. You may then wash the wound gently with mild soapy water. Repack with gauze or do as your caregiver directs.  SEEK IMMEDIATE MEDICAL CARE IF:   · You develop increased pain, swelling, redness, drainage, or bleeding in the wound site.  · You develop signs of generalized infection including muscle aches, chills, fever, or a general ill feeling.  · An oral temperature above 102° F (38.9° C) develops, not controlled by medication.  See your caregiver for a recheck if you develop any of the symptoms described above. If medications (antibiotics) were prescribed, take them as directed.  Document Released: 07/06/2006 Document Revised: 03/11/2013 Document Reviewed: 03/02/2009  [employer]® Patient Information ©2014 [employer].

## 2019-01-31 NOTE — PROGRESS NOTES
Established Patient    Gita presents today with the following:    CC: Neck pain, carbuncle, and flu shot    HPI: Gita is a 58-year-old female with a past medical history significant for carbuncle.  Patient was seen in the clinic on January 22, 2019 for a carbuncle.  Clinicians were concerned that patient might of had an MRSA infection due to erythema, purulence, and tenderness in the wound.  Patient was referred to wound clinic, wound cultures were ordered, Bactrim DS prescribed, and antibiotic ointment.  Patient did not follow-up with wound clinic or cultures, she states that she completed her antibiotic treatment and is still currently using the antibiotic ointment.  Patient states that she does not want to go to the emergency department for further wound care, she mentions that she will take care of it at home.    Patient notes that she recently began developing neck pain.  Pain is located between her spine and right scapula.  Pain is made worse on movement and on stretching.  Patient works as a caregiver and occasionally lifts heavy objects.  Patient reports that naproxen has provided some relief.    Patient is here for influenza vaccine    Patient Active Problem List    Diagnosis Date Noted   • Carbuncle and furuncle of leg 01/31/2019   • Neck pain 01/31/2019       Current Outpatient Prescriptions   Medication Sig Dispense Refill   • ibuprofen (MOTRIN) 600 MG Tab Take 1 Tab by mouth every 6 hours as needed. 30 Tab 0   • nicotine (NICODERM) 7 MG/24HR PATCH 24 HR Apply 1 Patch to skin as directed every 24 hours. 30 Patch 3   • mupirocin (BACTROBAN) 2 % Ointment Apply 1 Application to affected area(s) 2 times a day. 1 Tube 1   • atorvastatin (LIPITOR) 40 MG Tab      • atorvastatin (LIPITOR) 20 MG Tab Take 2 Tabs by mouth every day. 30 Tab 3     No current facility-administered medications for this visit.        ROS: As per HPI. Additional pertinent systems as noted below.  Constitutional: Negative for  "chills and fever.   HENT: Negative for ear pain and sore throat.    Eyes: Negative for discharge and redness.   Respiratory: Negative for cough, hemoptysis, wheezing and stridor.    Cardiovascular: Negative for chest pain, palpitations and leg swelling.   Gastrointestinal: Negative for abdominal pain, constipation, diarrhea, heartburn, nausea and vomiting.   Genitourinary: Negative for dysuria, flank pain and hematuria.   Musculoskeletal: Negative for falls and myalgias.   Skin: Negative for itching and rash.   Neurological: Negative for dizziness, seizures, loss of consciousness and headaches.   Endo/Heme/Allergies: Negative for polydipsia. Does not bruise/bleed easily.   Psychiatric/Behavioral: Negative for substance abuse and suicidal ideas.       /78 (BP Location: Left arm, Patient Position: Sitting, BP Cuff Size: Adult)   Pulse 94   Temp 36.9 °C (98.5 °F) (Temporal)   Ht 1.651 m (5' 5\")   Wt 82.1 kg (181 lb)   SpO2 98%   BMI 30.12 kg/m²     Physical Exam   Constitutional:  oriented to person, place, and time. No distress.   Eyes: Pupils are equal, round, and reactive to light. No scleral icterus.  Neck: Neck supple. No thyromegaly present.   Cardiovascular: Normal rate, regular rhythm and normal heart sounds.  Exam reveals no gallop and no friction rub.  No murmur heard.  Pulmonary/Chest: Breath sounds normal. Chest wall is not dull to percussion.   Musculoskeletal: Pain on examination between right scapula and spinal cord near T8.  Lymphadenopathy: no cervical adenopathy  Neurological: alert and oriented to person, place, and time.   Skin: Right thigh carbuncle, no erythema noted, no drainage noted, lesion is not raised.  Based on previous description of wound it appears that lesion is improving.      Note: I have reviewed all pertinent labs and diagnostic tests associated with this visit with specific comments listed under the assessment and plan below    Assessment and Plan    1. Need for " vaccination  - Influenza Vaccine Quad Injection >3Y (PF)    2. Carbuncle and furuncle of leg  Patient's carbuncle has improved.  Patient was told that she could still go to the wound clinic and be followed up, patient does not want to go to the wound clinic.  I told the patient to continue her antibiotic ointment and if her lesion becomes worse to go to the emergency department.  Patient met this with good understanding but stated that she would be okay and did not think that she would need to go to the ER.  Again I stressed going to the emergency department if lesion worsen.    3. Neck pain  Pain appears to be musculoskeletal in nature, especially taking into consideration patient's job as a caregiver and occasionally lifting heavy objects.  Patient reported improvement with naproxen.  Patient was told to apply heat and was prescribed ibuprofen.        Followup: Return for follow up with Dr. Elaine.      Signed by: Jeromy Muñiz M.D.

## 2020-02-28 ENCOUNTER — NON-PROVIDER VISIT (OUTPATIENT)
Dept: NEUROLOGY | Facility: MEDICAL CENTER | Age: 60
End: 2020-02-28
Payer: COMMERCIAL

## 2020-02-28 DIAGNOSIS — G62.9 NEUROPATHY: ICD-10-CM

## 2020-02-28 NOTE — PROGRESS NOTES
The patient was seen for an EMG and nerve conduction studies.  She was unable to tolerate nerve conduction studies and the procedure was terminated.

## 2020-10-19 ENCOUNTER — OFFICE VISIT (OUTPATIENT)
Dept: MEDICAL GROUP | Facility: MEDICAL CENTER | Age: 60
End: 2020-10-19
Attending: PHYSICIAN ASSISTANT
Payer: MEDICAID

## 2020-10-19 VITALS
BODY MASS INDEX: 27.99 KG/M2 | RESPIRATION RATE: 16 BRPM | TEMPERATURE: 98.1 F | OXYGEN SATURATION: 95 % | HEART RATE: 89 BPM | SYSTOLIC BLOOD PRESSURE: 106 MMHG | HEIGHT: 65 IN | DIASTOLIC BLOOD PRESSURE: 72 MMHG | WEIGHT: 168 LBS

## 2020-10-19 DIAGNOSIS — M54.32 LEFT SIDED SCIATICA: ICD-10-CM

## 2020-10-19 DIAGNOSIS — M16.12 PRIMARY OSTEOARTHRITIS OF LEFT HIP: ICD-10-CM

## 2020-10-19 DIAGNOSIS — Z12.31 ENCOUNTER FOR SCREENING MAMMOGRAM FOR MALIGNANT NEOPLASM OF BREAST: ICD-10-CM

## 2020-10-19 DIAGNOSIS — Z23 NEED FOR VACCINATION: ICD-10-CM

## 2020-10-19 PROBLEM — R73.03 PREDIABETES: Status: ACTIVE | Noted: 2019-11-25

## 2020-10-19 PROBLEM — M25.552 PAIN IN LEFT HIP: Status: ACTIVE | Noted: 2020-01-03

## 2020-10-19 PROBLEM — M75.52 BURSITIS OF LEFT SHOULDER: Status: ACTIVE | Noted: 2019-11-25

## 2020-10-19 PROBLEM — F17.200 SMOKING: Status: ACTIVE | Noted: 2019-11-11

## 2020-10-19 PROBLEM — K76.0 FATTY LIVER: Status: ACTIVE | Noted: 2020-03-06

## 2020-10-19 PROBLEM — E78.5 HYPERLIPIDEMIA: Status: ACTIVE | Noted: 2019-11-11

## 2020-10-19 PROBLEM — G62.9 NEUROPATHY: Status: ACTIVE | Noted: 2020-01-03

## 2020-10-19 PROBLEM — F10.10 ALCOHOL ABUSE: Status: ACTIVE | Noted: 2019-11-25

## 2020-10-19 PROCEDURE — 99213 OFFICE O/P EST LOW 20 MIN: CPT | Performed by: PHYSICIAN ASSISTANT

## 2020-10-19 PROCEDURE — 99204 OFFICE O/P NEW MOD 45 MIN: CPT | Performed by: PHYSICIAN ASSISTANT

## 2020-10-19 PROCEDURE — 99203 OFFICE O/P NEW LOW 30 MIN: CPT | Mod: 25 | Performed by: PHYSICIAN ASSISTANT

## 2020-10-19 PROCEDURE — 90686 IIV4 VACC NO PRSV 0.5 ML IM: CPT

## 2020-10-19 RX ORDER — IBUPROFEN 600 MG/1
TABLET ORAL
COMMUNITY
Start: 2019-01-31 | End: 2020-12-28

## 2020-10-19 RX ORDER — GABAPENTIN 100 MG/1
CAPSULE ORAL
COMMUNITY
Start: 2020-03-27 | End: 2020-10-19

## 2020-10-19 RX ORDER — DICLOFENAC SODIUM 25 MG/1
TABLET, DELAYED RELEASE ORAL
COMMUNITY
End: 2020-12-28

## 2020-10-19 RX ORDER — IBUPROFEN 600 MG/1
600 TABLET ORAL EVERY 6 HOURS PRN
Qty: 90 TAB | Refills: 0 | Status: SHIPPED | OUTPATIENT
Start: 2020-10-19 | End: 2020-12-28

## 2020-10-19 RX ORDER — LIDOCAINE 50 MG/G
PATCH TOPICAL
COMMUNITY
Start: 2019-12-27 | End: 2021-07-20

## 2020-10-19 RX ORDER — GABAPENTIN 300 MG/1
300 CAPSULE ORAL 3 TIMES DAILY
Qty: 90 CAP | Refills: 5 | Status: SHIPPED | OUTPATIENT
Start: 2020-10-19 | End: 2021-08-02

## 2020-10-19 RX ORDER — SULFAMETHOXAZOLE AND TRIMETHOPRIM 800; 160 MG/1; MG/1
TABLET ORAL
COMMUNITY
End: 2023-09-19

## 2020-10-19 RX ORDER — GABAPENTIN 300 MG/1
CAPSULE ORAL
COMMUNITY
Start: 2020-10-07 | End: 2020-10-19 | Stop reason: SDUPTHER

## 2020-10-19 RX ORDER — SODIUM FLUORIDE 5 MG/G
GEL, DENTIFRICE DENTAL
COMMUNITY
End: 2023-10-31

## 2020-10-19 RX ORDER — ATORVASTATIN CALCIUM 40 MG/1
TABLET, FILM COATED ORAL
COMMUNITY
Start: 2019-08-27 | End: 2020-10-20

## 2020-10-19 ASSESSMENT — ENCOUNTER SYMPTOMS
CHILLS: 0
WEAKNESS: 0
PALPITATIONS: 0
BLURRED VISION: 0
COUGH: 0
CLAUDICATION: 0
DIARRHEA: 0
PHOTOPHOBIA: 0
FEVER: 0
VOMITING: 0
BLOOD IN STOOL: 0
CONSTIPATION: 0
SORE THROAT: 0
DOUBLE VISION: 0
HEADACHES: 0
WHEEZING: 0
SHORTNESS OF BREATH: 0
DIZZINESS: 0
NERVOUS/ANXIOUS: 0
DEPRESSION: 0
NAUSEA: 0
TINGLING: 0
WEIGHT LOSS: 0
SINUS PAIN: 0

## 2020-10-19 ASSESSMENT — PATIENT HEALTH QUESTIONNAIRE - PHQ9: CLINICAL INTERPRETATION OF PHQ2 SCORE: 0

## 2020-10-19 ASSESSMENT — LIFESTYLE VARIABLES: SUBSTANCE_ABUSE: 0

## 2020-10-19 NOTE — PROGRESS NOTES
Chief Complaint   Patient presents with   • Establish Care   • Hip Pain   • Medication Refill       Subjective:     HPI:   Gita Pack is a 60 y.o. female here to establish care, and to discuss the evaluation and management of:    Primary osteoarthritis of left hip  Gita has a history of primary osteoarthritis of the left hip with left sided sciatica. She reports that she was seen by an orthopedic surgeon that required her to stop smoking for 8 weeks before he would perform a hip replacement. She reports that she was unable to and therefore, has not had the surgery done. She would like a second opinion from a different surgeon for further evaluation as the hip osteoarthritis is causing her an immense amount of pain and is now causing her to have mobility and lower extremity neurological problems.     She reports that she has completed PT which she felt was beneficial for a short time.   Takes IBU and gabapentin for pain.       ROS  Review of Systems   Constitutional: Negative for chills, fever, malaise/fatigue and weight loss.   HENT: Negative for congestion, sinus pain and sore throat.    Eyes: Negative for blurred vision, double vision and photophobia.   Respiratory: Negative for cough, shortness of breath and wheezing.    Cardiovascular: Negative for chest pain, palpitations, claudication and leg swelling.   Gastrointestinal: Negative for blood in stool, constipation, diarrhea, melena, nausea and vomiting.   Genitourinary: Negative for dysuria, frequency, hematuria and urgency.   Musculoskeletal:        + bilateral hip pain, L > R    Skin: Negative for itching and rash.   Neurological: Negative for dizziness, tingling, weakness and headaches.        + numbness/tingling pain of left lower extremity.    Psychiatric/Behavioral: Negative for depression, substance abuse and suicidal ideas. The patient is not nervous/anxious.        No Known Allergies    Current medicines (including changes today)  Current  Outpatient Medications   Medication Sig Dispense Refill   • ibuprofen (MOTRIN) 600 MG Tab Take 1 Tab by mouth every 6 hours as needed for Moderate Pain. 90 Tab 0   • gabapentin (NEURONTIN) 300 MG Cap Take 1 Cap by mouth 3 times a day. 90 Cap 5   • atorvastatin (LIPITOR) 20 MG Tab Take 2 Tabs by mouth every day. 30 Tab 3   • nicotine (NICODERM) 7 MG/24HR PATCH 24 HR NICOTINE 7 MG/24HR PT24     • ibuprofen (MOTRIN) 600 MG Tab IBUPROFEN 600 MG TABS     • atorvastatin (LIPITOR) 40 MG Tab ATORVASTATIN CALCIUM 40 MG TABS     • sulfamethoxazole-trimethoprim (BACTRIM DS) 800-160 MG tablet sulfamethoxazole 800 mg-trimethoprim 160 mg tablet     • nicotine polacrilex (NICORETTE) 2 MG Gum nicotine (polacrilex) 2 mg gum     • SODIUM FLUORIDE, DENTAL GEL, (PREVIDENT) 1.1 % Gel PreviDent 5000 Booster Plus 1.1 % dental paste   USE A PEA SIZED AMOUNT OF TOOTHPASTE WHEN BRUSHING -USE 2X DAILY AM AND PM     • lidocaine (LIDODERM) 5 % Patch LIDODERM 5 % PTCH     • diclofenac DR (VOLTAREN) 25 MG Tablet Delayed Response diclofenac sodium 25 mg tablet,delayed release     • Diclofenac Sodium (VOLTAREN) 1 % Gel VOLTAREN 1 % GEL     • atorvastatin (LIPITOR) 40 MG Tab      • nicotine (NICODERM) 7 MG/24HR PATCH 24 HR Apply 1 Patch to skin as directed every 24 hours. (Patient not taking: Reported on 10/19/2020) 30 Patch 3   • mupirocin (BACTROBAN) 2 % Ointment Apply 1 Application to affected area(s) 2 times a day. (Patient not taking: Reported on 10/19/2020) 1 Tube 1     No current facility-administered medications for this visit.      She  has a past medical history of Hypercholesterolemia and Prediabetes. She also has no past medical history of Breast cancer (HCC).  She  has a past surgical history that includes cystoscopy (5/29/08); breast biopsy; and abdominal hysterectomy total (5/29/08).  Social History     Tobacco Use   • Smoking status: Current Every Day Smoker     Packs/day: 1.00     Years: 40.00     Pack years: 40.00     Types:  "Cigarettes   • Smokeless tobacco: Never Used   Substance Use Topics   • Alcohol use: Yes     Comment: 3 drinks (mixed drinks with vodka)/day in evening    • Drug use: No       Family History   Problem Relation Age of Onset   • Hyperlipidemia Mother    • Diabetes Mother    • Diabetes Father    • Hyperlipidemia Father    • Heart Disease Father    • Cancer Maternal Grandmother    • Hyperlipidemia Maternal Grandmother      Family Status   Relation Name Status   • Mo  (Not Specified)   • Fa  (Not Specified)   • MGMo         Patient Active Problem List    Diagnosis Date Noted   • Primary osteoarthritis of left hip 10/19/2020   • Carbuncle and furuncle of leg 2019   • Neck pain 2019        Objective:     /72 (BP Location: Left arm, Patient Position: Sitting, BP Cuff Size: Adult)   Pulse 89   Temp 36.7 °C (98.1 °F) (Temporal)   Resp 16   Ht 1.651 m (5' 5\")   Wt 76.2 kg (168 lb)   SpO2 95%  Body mass index is 27.96 kg/m².    Physical Exam:  Physical Exam   Constitutional: She is oriented to person, place, and time and well-developed, well-nourished, and in no distress.   HENT:   Head: Normocephalic.   Right Ear: External ear normal.   Left Ear: External ear normal.   Eyes: Pupils are equal, round, and reactive to light.   Neck: Normal range of motion. No thyromegaly present.   Cardiovascular: Normal rate, regular rhythm and normal heart sounds.   Pulmonary/Chest: Effort normal and breath sounds normal.   Musculoskeletal:      Left hip: She exhibits decreased range of motion.      Comments: Abnormal gait due to pain. Sensation and strength intact.   Lymphadenopathy:     She has no cervical adenopathy.        Right: No supraclavicular adenopathy present.        Left: No supraclavicular adenopathy present.   Neurological: She is alert and oriented to person, place, and time. Gait normal.   Skin: Skin is warm and dry.   Psychiatric: Affect and judgment normal.   Vitals reviewed.       Assessment " and Plan:     The following treatment plan was discussed:    1. Primary osteoarthritis of left hip  - This is a chronic condition.  - Plan: Patient will call the office today to give us the name of the orthopedic surgeon that she would like to be referred to for a second opinion. I will place the referral once we receive the information.   - ibuprofen (MOTRIN) 600 MG Tab; Take 1 Tab by mouth every 6 hours as needed for Moderate Pain.  Dispense: 90 Tab; Refill: 0  - gabapentin (NEURONTIN) 300 MG Cap; Take 1 Cap by mouth 3 times a day.  Dispense: 90 Cap; Refill: 5    2. Encounter for screening mammogram for malignant neoplasm of breast  - MA-SCREENING MAMMO BILAT W/TOMOSYNTHESIS W/CAD; Future    3. Need for vaccination  - Influenza Vaccine Quad Injection (PF)    4. Left sided sciatica  - gabapentin (NEURONTIN) 300 MG Cap; Take 1 Cap by mouth 3 times a day.  Dispense: 90 Cap; Refill: 5    ** Patient will fax over her most recent labs from this year for review.    Any change or worsening of signs or symptoms, patient encouraged to follow-up or report to emergency room for further evaluation. Patient verbalizes understanding and agrees.    Follow-Up: Return in about 1 month (around 11/19/2020).      PLEASE NOTE: This dictation was created using voice recognition software. I have made every reasonable attempt to correct obvious errors, but I expect that there are errors of grammar and possibly content that I did not discover before finalizing the note.

## 2020-10-19 NOTE — ASSESSMENT & PLAN NOTE
Gita has a history of primary osteoarthritis of the left hip with left sided sciatica. She reports that she was seen by an orthopedic surgeon that required her to stop smoking for 8 weeks before he would perform a hip replacement. She reports that she was unable to and therefore, has not had the surgery done. She would like a second opinion from a different surgeon for further evaluation as the hip osteoarthritis is causing her an immense amount of pain and is now causing her to have mobility and lower extremity neurological problems.     She reports that she has completed PT which she felt was beneficial for a short time.   Takes IBU and gabapentin for pain.

## 2020-10-20 DIAGNOSIS — M25.552 PAIN IN LEFT HIP: ICD-10-CM

## 2020-10-20 DIAGNOSIS — E78.49 OTHER HYPERLIPIDEMIA: ICD-10-CM

## 2020-10-20 DIAGNOSIS — M16.12 PRIMARY OSTEOARTHRITIS OF LEFT HIP: ICD-10-CM

## 2020-10-20 DIAGNOSIS — M54.32 LEFT SIDED SCIATICA: ICD-10-CM

## 2020-10-20 RX ORDER — ATORVASTATIN CALCIUM 40 MG/1
40 TABLET, FILM COATED ORAL
Qty: 30 TAB | Refills: 11 | Status: SHIPPED | OUTPATIENT
Start: 2020-10-20 | End: 2021-04-28 | Stop reason: SDUPTHER

## 2020-12-28 ENCOUNTER — TELEPHONE (OUTPATIENT)
Dept: MEDICAL GROUP | Facility: MEDICAL CENTER | Age: 60
End: 2020-12-28

## 2020-12-28 DIAGNOSIS — M54.2 NECK PAIN: ICD-10-CM

## 2020-12-28 RX ORDER — NAPROXEN 500 MG/1
500 TABLET ORAL 2 TIMES DAILY WITH MEALS
Qty: 60 TAB | Refills: 2 | Status: SHIPPED | OUTPATIENT
Start: 2020-12-28 | End: 2021-03-28

## 2020-12-29 NOTE — TELEPHONE ENCOUNTER
1. Caller Name: Gita Pack                        Call Back Number: [phone] (home) [phone] (work)      How would the patient prefer to be contacted with a response: Phone call OK to leave a detailed message    PT REQUESTING FOR naproxen (NAPROSYN) 500 MG Tab. Not an active Rx in chart.

## 2021-03-15 DIAGNOSIS — Z23 NEED FOR VACCINATION: ICD-10-CM

## 2021-03-27 ENCOUNTER — HOSPITAL ENCOUNTER (EMERGENCY)
Facility: MEDICAL CENTER | Age: 61
End: 2021-03-28
Attending: EMERGENCY MEDICINE
Payer: MEDICAID

## 2021-03-27 VITALS
SYSTOLIC BLOOD PRESSURE: 166 MMHG | WEIGHT: 174.16 LBS | HEIGHT: 65 IN | BODY MASS INDEX: 29.02 KG/M2 | TEMPERATURE: 98.1 F | OXYGEN SATURATION: 99 % | DIASTOLIC BLOOD PRESSURE: 85 MMHG | RESPIRATION RATE: 16 BRPM | HEART RATE: 82 BPM

## 2021-03-27 DIAGNOSIS — M25.552 PAIN OF BOTH HIP JOINTS: ICD-10-CM

## 2021-03-27 DIAGNOSIS — M19.90 ARTHRITIS: ICD-10-CM

## 2021-03-27 DIAGNOSIS — M25.551 PAIN OF BOTH HIP JOINTS: ICD-10-CM

## 2021-03-27 DIAGNOSIS — M25.552 PAIN IN LEFT HIP: ICD-10-CM

## 2021-03-27 PROCEDURE — 99284 EMERGENCY DEPT VISIT MOD MDM: CPT

## 2021-03-27 ASSESSMENT — PAIN DESCRIPTION - DESCRIPTORS: DESCRIPTORS: ACHING

## 2021-03-28 ENCOUNTER — APPOINTMENT (OUTPATIENT)
Dept: RADIOLOGY | Facility: MEDICAL CENTER | Age: 61
End: 2021-03-28
Attending: EMERGENCY MEDICINE
Payer: MEDICAID

## 2021-03-28 PROCEDURE — A9270 NON-COVERED ITEM OR SERVICE: HCPCS | Performed by: EMERGENCY MEDICINE

## 2021-03-28 PROCEDURE — 700111 HCHG RX REV CODE 636 W/ 250 OVERRIDE (IP): Performed by: EMERGENCY MEDICINE

## 2021-03-28 PROCEDURE — 73522 X-RAY EXAM HIPS BI 3-4 VIEWS: CPT

## 2021-03-28 PROCEDURE — 96372 THER/PROPH/DIAG INJ SC/IM: CPT

## 2021-03-28 PROCEDURE — 700102 HCHG RX REV CODE 250 W/ 637 OVERRIDE(OP): Performed by: EMERGENCY MEDICINE

## 2021-03-28 RX ORDER — NAPROXEN 500 MG/1
500 TABLET ORAL 2 TIMES DAILY WITH MEALS
Qty: 30 TABLET | Refills: 0 | Status: SHIPPED | OUTPATIENT
Start: 2021-03-28 | End: 2021-04-01

## 2021-03-28 RX ORDER — HYDROCODONE BITARTRATE AND ACETAMINOPHEN 5; 325 MG/1; MG/1
1 TABLET ORAL ONCE
Status: COMPLETED | OUTPATIENT
Start: 2021-03-28 | End: 2021-03-28

## 2021-03-28 RX ORDER — HYDROCODONE BITARTRATE AND ACETAMINOPHEN 5; 325 MG/1; MG/1
1 TABLET ORAL EVERY 8 HOURS PRN
Qty: 9 TABLET | Refills: 0 | Status: SHIPPED | OUTPATIENT
Start: 2021-03-28 | End: 2021-03-31

## 2021-03-28 RX ORDER — KETOROLAC TROMETHAMINE 30 MG/ML
15 INJECTION, SOLUTION INTRAMUSCULAR; INTRAVENOUS ONCE
Status: COMPLETED | OUTPATIENT
Start: 2021-03-28 | End: 2021-03-28

## 2021-03-28 RX ADMIN — KETOROLAC TROMETHAMINE 15 MG: 30 INJECTION, SOLUTION INTRAMUSCULAR at 00:25

## 2021-03-28 RX ADMIN — HYDROCODONE BITARTRATE AND ACETAMINOPHEN 1 TABLET: 5; 325 TABLET ORAL at 00:25

## 2021-03-28 NOTE — ED PROVIDER NOTES
ED Provider Note    CHIEF COMPLAINT  Chief Complaint   Patient presents with   • Hip Pain     HPI  Patient is a 60-year-old female with a history of hypertension and osteoarthritis who presents emergency room for evaluation of worsening bilateral hip pain.  Patient states that she has had some form of hip pain for the better part of the year but has noticed worsening and escalating pain over the last 2 weeks.  She describes this as a shooting pain that feels like it is worse on the left side and is on the right, worsens with movement, sometimes worse in the afternoon and she has been taking naproxen and gabapentin with some intermittent relief.  She denies any recent falls, denies any back pains, chest pains or abdominal pains.  She has been urinating without difficulty and denies any numbness into her groin or any bowel or bladder incontinence.    PPE Note: I personally donned full PPE for all patient encounters during this visit, including being clean-shaven with an N95 respirator mask, gloves, and goggles.     She normally walks with a walker    REVIEW OF SYSTEMS  See HPI for further details. All other systems are negative.     PAST MEDICAL HISTORY   has a past medical history of Hypercholesterolemia and Prediabetes.    SOCIAL HISTORY  Social History     Tobacco Use   • Smoking status: Current Every Day Smoker     Packs/day: 0.50     Years: 40.00     Pack years: 20.00     Types: Cigarettes   • Smokeless tobacco: Never Used   Substance and Sexual Activity   • Alcohol use: Yes     Comment: 3 drinks (mixed drinks with vodka)/day in evening    • Drug use: No   • Sexual activity: Never     SURGICAL HISTORY   has a past surgical history that includes cystoscopy (5/29/08); breast biopsy; and abdominal hysterectomy total (5/29/08).    CURRENT MEDICATIONS  Home Medications     Reviewed by Cecily Vargas R.N. (Registered Nurse) on 03/27/21 at 2105  Med List Status: Partial   Medication Last Dose Status   atorvastatin  "(LIPITOR) 40 MG Tab  Active   gabapentin (NEURONTIN) 300 MG Cap  Active   lidocaine (LIDODERM) 5 % Patch  Active   naproxen (NAPROSYN) 500 MG Tab  Active   nicotine (NICODERM) 7 MG/24HR PATCH 24 HR  Active   nicotine polacrilex (NICORETTE) 2 MG Gum  Active   SODIUM FLUORIDE, DENTAL GEL, (PREVIDENT) 1.1 % Gel  Active   sulfamethoxazole-trimethoprim (BACTRIM DS) 800-160 MG tablet  Active              ALLERGIES  No Known Allergies    PHYSICAL EXAM  VITAL SIGNS: BP (!) 166/85   Pulse 82   Temp 36.7 °C (98.1 °F) (Temporal)   Resp 16   Ht 1.651 m (5' 5\")   Wt 79 kg (174 lb 2.6 oz)   SpO2 99%   BMI 28.98 kg/m²   Pulse ox interpretation: I interpret this pulse ox as normal.  Genl: F sitting in chair comfortably, speaking clearly, appears in mild distress  Head: NC/AT   ENT: Mucous membranes moist, posterior pharynx clear, uvula midline, nares patent bilaterally  Pulmonary: Lungs are clear to auscultation bilaterally  Chest: No TTP  CV:  RRR, no murmur appreciated, pulses 2+ in both upper and lower extremities  Abdomen: soft, NT/ND; no rebound/guarding, no masses palpated, no HSM   : no CVA or suprapubic tenderness  Musculoskeletal: Pain free ROM of the neck. Moving upper and lower extremities and spontaneous in coordinated fashion  Bilateral Lower Extremity  Point tenderness with palpation of the skin overlying the bilateral lateral aspects of the hip.  No skin changes, bruising or pain throughout the paralumbar musculature.  No tenderness in the gluteal musculature.  - Skin: no abrasions, lacerations or ecchymosis  - Motor: Full ROM at knee, ankle; 5/5 ankle dorsal/plantar flexion, EHL/FHL intact bilaterally  - Sensation intact to superficial/deep peroneal, tibial, saphenous, sural nerves bilaterlly  - 2+ dorsalis pedis and posterior tibialis, cap refill < 2 seconds x 5 digits  ?Neuro: A&Ox4 (person, place, time, situation), speech fluent, gait steady, no focal deficits appreciated, No cerebellar signs.  Sensation " is grossly intact in the distal upper and lower extremities.  5/5 strength in  and dorsiflexion/plantar flexion of the ankles  Psych: Patient has an appropriate affect and behavior  Skin: No rash or lesions.  No pallor or jaundice.  No cyanosis.  Warm and dry.     DIAGNOSTIC STUDIES / PROCEDURES  LABS  Labs Reviewed - No data to display    RADIOLOGY  DX-HIP-BILATERAL-WITH PELVIS-3/4 VIEWS   Final Result      Severe bilateral hip arthropathy, left greater than right.        COURSE & MEDICAL DECISION MAKING  Pertinent Labs & Imaging studies reviewed. (See chart for details)    DDX: Osteoarthritis, arthritis, bursitis, hip fractures    MDM    Initial evaluation at 2358:  Patient is seen and evaluated at the bedside for the symptoms as described above.  Patient has had acute on chronic pain for some time with interval worsening and has not been seen or evaluated recently for orthopedic injuries.  She was initially dressed so she was undressed and there was no evidence of bruising or hematoma/asymmetries.  She has easily reproducible tenderness with manipulation of the joint spaces however this is not out of proportion to exam and she is afebrile with no tachycardia.  Concern for underlying septic joint is low and most likely severe osteoarthritis/arthritis or possible underlying fracture is considered.  Bilateral hip views show severe hip arthropathy with the left greater than the right which fits with her presenting clinical picture.  She has no neurovascular compromise, she normally walks with a walker and following administration of oral pain medication she is able to ambulate with some improvement.  She has not followed up with an orthopedist for any further treatment interventions her options and this would be my acute recommendation at this time.  Labs were not acutely obtained with normal vital signs, no overlying sign of infectious etiology and improving pain following low potency medications.  Questions  are addressed at bedside and she is discharged home in stable condition.    HTN/IDDM FOLLOW UP:  The patient has known hypertension and is being followed by their primary care doctor    The patient will not drink alcohol nor drive with prescribed medications. The patient will return for worsening symptoms and is stable at the time of discharge. The patient verbalizes understanding and will comply.    FINAL IMPRESSION  Visit Diagnoses     ICD-10-CM   1. Pain of both hip joints  M25.551    M25.552   2. Arthritis  M19.90   3. Pain in left hip  M25.552     Electronically signed by: Andrea Guerrero M.D., 3/27/2021 11:52 PM

## 2021-03-28 NOTE — ED TRIAGE NOTES
Chief Complaint   Patient presents with   • Hip Pain     Patient to triage via her own walker. Patient c/o bilateral hip pain x 2 weeks. Patient reports a history of arthritis that is getting worse. Patient has been taking her naproxen and gabapentin with little relief.

## 2021-04-01 DIAGNOSIS — M16.12 PRIMARY OSTEOARTHRITIS OF LEFT HIP: ICD-10-CM

## 2021-04-01 RX ORDER — NAPROXEN 500 MG/1
TABLET ORAL
Qty: 60 TABLET | Refills: 0 | Status: SHIPPED | OUTPATIENT
Start: 2021-04-01 | End: 2021-05-17

## 2021-04-06 ENCOUNTER — OFFICE VISIT (OUTPATIENT)
Dept: MEDICAL GROUP | Facility: MEDICAL CENTER | Age: 61
End: 2021-04-06
Attending: PHYSICIAN ASSISTANT
Payer: MEDICAID

## 2021-04-06 VITALS
SYSTOLIC BLOOD PRESSURE: 150 MMHG | RESPIRATION RATE: 18 BRPM | OXYGEN SATURATION: 95 % | WEIGHT: 171.1 LBS | TEMPERATURE: 97.5 F | HEIGHT: 65 IN | HEART RATE: 87 BPM | DIASTOLIC BLOOD PRESSURE: 90 MMHG | BODY MASS INDEX: 28.51 KG/M2

## 2021-04-06 DIAGNOSIS — R03.0 ELEVATED BLOOD PRESSURE READING: ICD-10-CM

## 2021-04-06 DIAGNOSIS — Z00.00 HEALTH CARE MAINTENANCE: ICD-10-CM

## 2021-04-06 DIAGNOSIS — M16.12 PRIMARY OSTEOARTHRITIS OF LEFT HIP: ICD-10-CM

## 2021-04-06 PROCEDURE — 99214 OFFICE O/P EST MOD 30 MIN: CPT | Performed by: PHYSICIAN ASSISTANT

## 2021-04-06 PROCEDURE — 99213 OFFICE O/P EST LOW 20 MIN: CPT | Performed by: PHYSICIAN ASSISTANT

## 2021-04-06 RX ORDER — METRONIDAZOLE 500 MG/1
TABLET ORAL
COMMUNITY
Start: 2021-03-01 | End: 2021-07-20

## 2021-04-06 ASSESSMENT — PATIENT HEALTH QUESTIONNAIRE - PHQ9: CLINICAL INTERPRETATION OF PHQ2 SCORE: 0

## 2021-04-06 NOTE — PATIENT INSTRUCTIONS
- Start monitoring blood pressure readings at home three days out of the week one AM and one PM reading for a total of 6 readings per week.   - Blood pressure should consistently be under 140 / under 90.  - Fax over blood pressure reading results on 4/14/21.

## 2021-04-06 NOTE — PROGRESS NOTES
Chief Complaint   Patient presents with   • Hospital Follow-up   • Hip Pain       Subjective:     HPI:   Gita Pack is a 60 y.o. female here to discuss the evaluation and management of:    Primary osteoarthritis of left hip  Gita presents today for follow up regarding chronic  left hip pain. She was seen in the hospital on 3/27/21 for severe hip pain. She had an xray of the bilateral hips which showed severe OA, left > right. On call ortho was paged and the patient was discharged to follow up in clinic with Dr. James Gayle on 4/6/21. She is scheduled to undergo a left sided hip replacement on 4/16/21.     Elevated blood pressure reading  Gita presents today for follow up. She was recently seen in the ED in which her blood pressure was elevated. In clinic today her blood pressure was 150/90. She has never been on anti-hypertensive medication in the past. She does not check her blood pressure regularly at home. She denies chest pain, SOB, HA's, lightheadedness or dizziness.     ROS  See HPI.     No Known Allergies    Current medicines (including changes today)  Current Outpatient Medications   Medication Sig Dispense Refill   • metroNIDAZOLE (FLAGYL) 500 MG Tab TAKE 1 TABLET BY MOUTH TWICE A DAY     • naproxen (NAPROSYN) 500 MG Tab TAKE 1 TABLET BY MOUTH TWICE A DAY WITH MEALS 60 tablet 0   • mupirocin (BACTROBAN) 2 % Ointment mupirocin 2 % topical ointment     • atorvastatin (LIPITOR) 40 MG Tab Take 1 Tab by mouth every bedtime. 30 Tab 11   • nicotine (NICODERM) 7 MG/24HR PATCH 24 HR NICOTINE 7 MG/24HR PT24     • sulfamethoxazole-trimethoprim (BACTRIM DS) 800-160 MG tablet sulfamethoxazole 800 mg-trimethoprim 160 mg tablet     • nicotine polacrilex (NICORETTE) 2 MG Gum nicotine (polacrilex) 2 mg gum     • SODIUM FLUORIDE, DENTAL GEL, (PREVIDENT) 1.1 % Gel PreviDent 5000 Booster Plus 1.1 % dental paste   USE A PEA SIZED AMOUNT OF TOOTHPASTE WHEN BRUSHING -USE 2X DAILY AM AND PM     • lidocaine (LIDODERM) 5  "% Patch LIDODERM 5 % PTCH     • gabapentin (NEURONTIN) 300 MG Cap Take 1 Cap by mouth 3 times a day. 90 Cap 5     No current facility-administered medications for this visit.       Social History     Tobacco Use   • Smoking status: Current Every Day Smoker     Packs/day: 0.50     Years: 40.00     Pack years: 20.00     Types: Cigarettes   • Smokeless tobacco: Never Used   Substance Use Topics   • Alcohol use: Yes     Comment: 3 drinks (mixed drinks with vodka)/day in evening    • Drug use: No       Patient Active Problem List    Diagnosis Date Noted   • Elevated blood pressure reading 04/06/2021   • Primary osteoarthritis of left hip 10/19/2020   • Fatty liver 03/06/2020   • Neuropathy 01/03/2020   • Pain in left hip 01/03/2020   • Alcohol abuse 11/25/2019   • Bursitis of left shoulder 11/25/2019   • Prediabetes 11/25/2019   • Hyperlipidemia 11/11/2019   • Smoking 11/11/2019   • Carbuncle and furuncle of leg 01/31/2019   • Neck pain 01/31/2019       Family History   Problem Relation Age of Onset   • Hyperlipidemia Mother    • Diabetes Mother    • Diabetes Father    • Hyperlipidemia Father    • Heart Disease Father    • Cancer Maternal Grandmother    • Hyperlipidemia Maternal Grandmother         Objective:     /90 (BP Location: Left arm, Patient Position: Sitting, BP Cuff Size: Adult)   Pulse 87   Temp 36.4 °C (97.5 °F) (Temporal)   Resp 18   Ht 1.651 m (5' 5\")   Wt 77.6 kg (171 lb 1.6 oz)   SpO2 95%  Body mass index is 28.47 kg/m².    Physical Exam:  Physical Exam   Constitutional: She is oriented to person, place, and time and well-developed, well-nourished, and in no distress.   HENT:   Head: Normocephalic.   Right Ear: Tympanic membrane, external ear and ear canal normal.   Left Ear: Tympanic membrane, external ear and ear canal normal.   Cardiovascular: Normal rate, regular rhythm and normal heart sounds.   Pulmonary/Chest: Effort normal and breath sounds normal.   Musculoskeletal:      Left hip: " Tenderness and crepitus present. Decreased range of motion. Decreased strength.   Neurological: She is alert and oriented to person, place, and time. Gait normal.   Skin: Skin is warm and dry.   Psychiatric: Affect and judgment normal.   Vitals reviewed.    Assessment and Plan:     The following treatment plan was discussed:    1. Primary osteoarthritis of left hip  - This is a chronic severe condition.  - Left hip replacement surgery scheduled for 4/16/21.  - Plan: Continue follow up with ortho as scheduled.     2. Elevated blood pressure reading  - This is a new condition.  - May be secondary to pain.   - Blood pressure taken in clinic today was 150/90.  - Stressed the importance of monitoring blood pressure at home for the next 1.5 weeks.   - Instructed to monitor blood pressure 2 times a day for at least 3 days out of the week. Once in the a.m. and once in the p.m.  - Keep a diary of blood pressure readings and fax BP results to our clinic in 1 week.     3. Health care maintenance  - Patient is due for repeat screening tests. I will notify the patient via telephone once I have received and reviewed his lab results.   - HEMOGLOBIN A1C; Future  - Lipid Profile; Future    Any change or worsening of signs or symptoms, patient encouraged to follow-up or report to emergency room for further evaluation. Patient verbalizes understanding and agrees.    Follow-Up: Return if symptoms worsen or fail to improve.      PLEASE NOTE: This dictation was created using voice recognition software. I have made every reasonable attempt to correct obvious errors, but I expect that there are errors of grammar and possibly content that I did not discover before finalizing the note.

## 2021-04-07 NOTE — ASSESSMENT & PLAN NOTE
Gita presents today for follow up regarding chronic  left hip pain. She was seen in the hospital on 3/27/21 for severe hip pain. She had an xray of the bilateral hips which showed severe OA, left > right. On call ortho was paged and the patient was discharged to follow up in clinic with Dr. James Gayle on 4/6/21. She is scheduled to undergo a left sided hip replacement on 4/16/21.

## 2021-04-07 NOTE — ASSESSMENT & PLAN NOTE
Gita presents today for follow up. She was recently seen in the ED in which her blood pressure was elevated. In clinic today her blood pressure was 150/90. She has never been on anti-hypertensive medication in the past. She does not check her blood pressure regularly at home. She denies chest pain, SOB, HA's, lightheadedness or dizziness.

## 2021-04-16 ENCOUNTER — HOSPITAL ENCOUNTER (OUTPATIENT)
Dept: HOSPITAL 8 - OUT | Age: 61
Discharge: HOME | End: 2021-04-16
Attending: ORTHOPAEDIC SURGERY
Payer: MEDICAID

## 2021-04-16 VITALS — SYSTOLIC BLOOD PRESSURE: 147 MMHG | DIASTOLIC BLOOD PRESSURE: 82 MMHG

## 2021-04-16 VITALS — WEIGHT: 174.17 LBS | HEIGHT: 65 IN | BODY MASS INDEX: 29.02 KG/M2

## 2021-04-16 DIAGNOSIS — M87.052: ICD-10-CM

## 2021-04-16 DIAGNOSIS — M25.752: ICD-10-CM

## 2021-04-16 DIAGNOSIS — E78.5: ICD-10-CM

## 2021-04-16 DIAGNOSIS — M16.12: Primary | ICD-10-CM

## 2021-04-16 DIAGNOSIS — Z79.01: ICD-10-CM

## 2021-04-16 DIAGNOSIS — Z20.822: ICD-10-CM

## 2021-04-16 DIAGNOSIS — Z79.899: ICD-10-CM

## 2021-04-16 LAB
ALBUMIN SERPL-MCNC: 4.1 G/DL (ref 3.4–5)
ALP SERPL-CCNC: 108 U/L (ref 45–117)
ALT SERPL-CCNC: 21 U/L (ref 12–78)
ANION GAP SERPL CALC-SCNC: 4 MMOL/L (ref 5–15)
BASOPHILS # BLD AUTO: 0.1 X10^3/UL (ref 0–0.1)
BASOPHILS NFR BLD AUTO: 1 % (ref 0–1)
BILIRUB SERPL-MCNC: 0.4 MG/DL (ref 0.2–1)
CALCIUM SERPL-MCNC: 9.4 MG/DL (ref 8.5–10.1)
CHLORIDE SERPL-SCNC: 111 MMOL/L (ref 98–107)
CREAT SERPL-MCNC: 0.67 MG/DL (ref 0.55–1.02)
EOSINOPHIL # BLD AUTO: 0.1 X10^3/UL (ref 0–0.4)
EOSINOPHIL NFR BLD AUTO: 1 % (ref 1–7)
ERYTHROCYTE [DISTWIDTH] IN BLOOD BY AUTOMATED COUNT: 14.7 % (ref 9.6–15.2)
EST. AVERAGE GLUCOSE BLD GHB EST-MCNC: 120 MG/DL (ref 0–126)
INR PPP: 0.97 (ref 0.93–1.1)
LYMPHOCYTES # BLD AUTO: 2.5 X10^3/UL (ref 1–3.4)
LYMPHOCYTES NFR BLD AUTO: 30 % (ref 22–44)
MCH RBC QN AUTO: 31 PG (ref 27–34.8)
MCHC RBC AUTO-ENTMCNC: 33.7 G/DL (ref 32.4–35.8)
MD: NO
MONOCYTES # BLD AUTO: 0.7 X10^3/UL (ref 0.2–0.8)
MONOCYTES NFR BLD AUTO: 8 % (ref 2–9)
NEUTROPHILS # BLD AUTO: 5.1 X10^3/UL (ref 1.8–6.8)
NEUTROPHILS NFR BLD AUTO: 61 % (ref 42–75)
PLATELET # BLD AUTO: 243 X10^3/UL (ref 130–400)
PMV BLD AUTO: 7.4 FL (ref 7.4–10.4)
PROT SERPL-MCNC: 7.4 G/DL (ref 6.4–8.2)
PROTHROMBIN TIME: 10.4 SECONDS (ref 9.6–11.5)
RBC # BLD AUTO: 5.28 X10^6/UL (ref 3.82–5.3)

## 2021-04-16 PROCEDURE — 76000 FLUOROSCOPY <1 HR PHYS/QHP: CPT

## 2021-04-16 PROCEDURE — C1713 ANCHOR/SCREW BN/BN,TIS/BN: HCPCS

## 2021-04-16 PROCEDURE — 85025 COMPLETE CBC W/AUTO DIFF WBC: CPT

## 2021-04-16 PROCEDURE — 80053 COMPREHEN METABOLIC PANEL: CPT

## 2021-04-16 PROCEDURE — 73501 X-RAY EXAM HIP UNI 1 VIEW: CPT

## 2021-04-16 PROCEDURE — 97162 PT EVAL MOD COMPLEX 30 MIN: CPT

## 2021-04-16 PROCEDURE — 93005 ELECTROCARDIOGRAM TRACING: CPT

## 2021-04-16 PROCEDURE — 86900 BLOOD TYPING SEROLOGIC ABO: CPT

## 2021-04-16 PROCEDURE — 83036 HEMOGLOBIN GLYCOSYLATED A1C: CPT

## 2021-04-16 PROCEDURE — 72170 X-RAY EXAM OF PELVIS: CPT

## 2021-04-16 PROCEDURE — 97165 OT EVAL LOW COMPLEX 30 MIN: CPT

## 2021-04-16 PROCEDURE — 87635 SARS-COV-2 COVID-19 AMP PRB: CPT

## 2021-04-16 PROCEDURE — 85610 PROTHROMBIN TIME: CPT

## 2021-04-16 PROCEDURE — 36415 COLL VENOUS BLD VENIPUNCTURE: CPT

## 2021-04-16 PROCEDURE — 86850 RBC ANTIBODY SCREEN: CPT

## 2021-04-16 PROCEDURE — 87081 CULTURE SCREEN ONLY: CPT

## 2021-04-16 PROCEDURE — 88304 TISSUE EXAM BY PATHOLOGIST: CPT

## 2021-04-16 PROCEDURE — 27130 TOTAL HIP ARTHROPLASTY: CPT

## 2021-04-16 PROCEDURE — C1776 JOINT DEVICE (IMPLANTABLE): HCPCS

## 2021-04-16 RX ADMIN — FENTANYL CITRATE PRN MCG: 50 INJECTION INTRAMUSCULAR; INTRAVENOUS at 09:50

## 2021-04-16 RX ADMIN — FENTANYL CITRATE PRN MCG: 50 INJECTION INTRAMUSCULAR; INTRAVENOUS at 09:35

## 2021-04-28 DIAGNOSIS — E78.49 OTHER HYPERLIPIDEMIA: ICD-10-CM

## 2021-04-28 RX ORDER — ATORVASTATIN CALCIUM 40 MG/1
40 TABLET, FILM COATED ORAL
Qty: 30 TABLET | Refills: 3 | Status: SHIPPED | OUTPATIENT
Start: 2021-04-28 | End: 2022-03-01

## 2021-04-28 NOTE — TELEPHONE ENCOUNTER
VOICEMAIL  1. Caller Name: Gita Pack                        Call Back Number: [phone] (home) [phone] (work)      2. Message: left pt Vm regarding about the vm she left about her medication.    3. Patient approves office to leave a detailed voicemail/MyChart message: yes

## 2021-04-28 NOTE — TELEPHONE ENCOUNTER
Received request via: Patient    Was the patient seen in the last year in this department? Yes    Does the patient have an active prescription (recently filled or refills available) for medication(s) requested? No     ( pt called and requested this medication

## 2021-05-11 ENCOUNTER — TELEPHONE (OUTPATIENT)
Dept: MEDICAL GROUP | Facility: MEDICAL CENTER | Age: 61
End: 2021-05-11

## 2021-05-11 DIAGNOSIS — Z00.00 HEALTHCARE MAINTENANCE: ICD-10-CM

## 2021-05-11 NOTE — TELEPHONE ENCOUNTER
1. Caller Name: Gita Pack                        Call Back Number: [phone] (home) [phone] (work)        How would the patient prefer to be contacted with a response: Phone call do NOT leave a detailed message     LVM for patient to call back regarding blood pressure readings and follow-up.

## 2021-05-17 ENCOUNTER — OFFICE VISIT (OUTPATIENT)
Dept: MEDICAL GROUP | Facility: MEDICAL CENTER | Age: 61
End: 2021-05-17
Attending: PHYSICIAN ASSISTANT
Payer: MEDICAID

## 2021-05-17 VITALS
HEART RATE: 80 BPM | DIASTOLIC BLOOD PRESSURE: 70 MMHG | BODY MASS INDEX: 29.17 KG/M2 | WEIGHT: 175.1 LBS | TEMPERATURE: 97.9 F | RESPIRATION RATE: 18 BRPM | OXYGEN SATURATION: 97 % | SYSTOLIC BLOOD PRESSURE: 122 MMHG | HEIGHT: 65 IN

## 2021-05-17 DIAGNOSIS — I10 ESSENTIAL HYPERTENSION: ICD-10-CM

## 2021-05-17 PROCEDURE — 99213 OFFICE O/P EST LOW 20 MIN: CPT | Performed by: PHYSICIAN ASSISTANT

## 2021-05-17 PROCEDURE — 99214 OFFICE O/P EST MOD 30 MIN: CPT | Performed by: PHYSICIAN ASSISTANT

## 2021-05-17 RX ORDER — OXYCODONE HYDROCHLORIDE 5 MG/1
TABLET ORAL
COMMUNITY
Start: 2021-04-16 | End: 2021-05-17

## 2021-05-17 RX ORDER — MELOXICAM 7.5 MG/1
TABLET ORAL
COMMUNITY
Start: 2021-05-13 | End: 2021-05-17

## 2021-05-17 RX ORDER — LISINOPRIL 10 MG/1
10 TABLET ORAL DAILY
Qty: 30 TABLET | Refills: 2 | Status: SHIPPED | OUTPATIENT
Start: 2021-05-17 | End: 2021-05-17

## 2021-05-17 RX ORDER — TRAMADOL HYDROCHLORIDE 50 MG/1
TABLET ORAL
COMMUNITY
Start: 2021-04-16 | End: 2021-05-17

## 2021-05-17 RX ORDER — LISINOPRIL 10 MG/1
10 TABLET ORAL DAILY
Qty: 30 TABLET | Refills: 2 | Status: SHIPPED | OUTPATIENT
Start: 2021-05-17 | End: 2021-06-02 | Stop reason: SDUPTHER

## 2021-05-17 NOTE — ASSESSMENT & PLAN NOTE
Gita presents today for follow-up. She is not currently on any blood pressure medications. She has been monitoring her blood pressure at home and has brought in her blood pressure log for review. Her readings have been as follows: 142/84, 162/93, 151/87, 144/88, 110/63, 107/60, 101/60, 157/86, 166/94, 165/91, 122/70. She denies chest pain, shortness of breath, headaches, lightheadedness or dizziness.

## 2021-05-17 NOTE — PROGRESS NOTES
Chief Complaint   Patient presents with   • Other     lab result       Subjective:     HPI:   Gita Pack is a 60 y.o. female here to discuss the evaluation and management of:    Essential hypertension  Gita presents today for follow-up. She is not currently on any blood pressure medications. She has been monitoring her blood pressure at home and has brought in her blood pressure log for review. Her readings have been as follows: 142/84, 162/93, 151/87, 144/88, 110/63, 107/60, 101/60, 157/86, 166/94, 165/91, 122/70. She denies chest pain, shortness of breath, headaches, lightheadedness or dizziness.    ROS  See HPI.     No Known Allergies    Current medicines (including changes today)  Current Outpatient Medications   Medication Sig Dispense Refill   • lisinopril (PRINIVIL) 10 MG Tab Take 1 tablet by mouth every day. 30 tablet 2   • atorvastatin (LIPITOR) 40 MG Tab Take 1 tablet by mouth at bedtime. 30 tablet 3   • metroNIDAZOLE (FLAGYL) 500 MG Tab TAKE 1 TABLET BY MOUTH TWICE A DAY     • mupirocin (BACTROBAN) 2 % Ointment mupirocin 2 % topical ointment     • nicotine (NICODERM) 7 MG/24HR PATCH 24 HR NICOTINE 7 MG/24HR PT24     • sulfamethoxazole-trimethoprim (BACTRIM DS) 800-160 MG tablet sulfamethoxazole 800 mg-trimethoprim 160 mg tablet     • nicotine polacrilex (NICORETTE) 2 MG Gum nicotine (polacrilex) 2 mg gum     • SODIUM FLUORIDE, DENTAL GEL, (PREVIDENT) 1.1 % Gel PreviDent 5000 Booster Plus 1.1 % dental paste   USE A PEA SIZED AMOUNT OF TOOTHPASTE WHEN BRUSHING -USE 2X DAILY AM AND PM     • lidocaine (LIDODERM) 5 % Patch LIDODERM 5 % PTCH     • gabapentin (NEURONTIN) 300 MG Cap Take 1 Cap by mouth 3 times a day. 90 Cap 5     No current facility-administered medications for this visit.       Social History     Tobacco Use   • Smoking status: Current Every Day Smoker     Packs/day: 0.50     Years: 40.00     Pack years: 20.00     Types: Cigarettes   • Smokeless tobacco: Never Used   Vaping Use   •  "Vaping Use: Never used   Substance Use Topics   • Alcohol use: Yes     Comment: 3 drinks (mixed drinks with vodka)/day in evening    • Drug use: No       Patient Active Problem List    Diagnosis Date Noted   • Essential hypertension 04/06/2021   • Primary osteoarthritis of left hip 10/19/2020   • Fatty liver 03/06/2020   • Neuropathy 01/03/2020   • Pain in left hip 01/03/2020   • Alcohol abuse 11/25/2019   • Bursitis of left shoulder 11/25/2019   • Prediabetes 11/25/2019   • Hyperlipidemia 11/11/2019   • Smoking 11/11/2019   • Carbuncle and furuncle of leg 01/31/2019   • Neck pain 01/31/2019       Family History   Problem Relation Age of Onset   • Hyperlipidemia Mother    • Diabetes Mother    • Diabetes Father    • Hyperlipidemia Father    • Heart Disease Father    • Cancer Maternal Grandmother    • Hyperlipidemia Maternal Grandmother         Objective:     /70 (BP Location: Left arm, Patient Position: Sitting, BP Cuff Size: Adult)   Pulse 80   Temp 36.6 °C (97.9 °F) (Temporal)   Resp 18   Ht 1.651 m (5' 5\")   Wt 79.4 kg (175 lb 1.6 oz)   SpO2 97%  Body mass index is 29.14 kg/m².    Physical Exam:  Physical Exam  HENT:      Head: Normocephalic.   Cardiovascular:      Rate and Rhythm: Normal rate and regular rhythm.      Heart sounds: Normal heart sounds.   Pulmonary:      Effort: Pulmonary effort is normal.      Breath sounds: Normal breath sounds.   Skin:     General: Skin is warm and dry.      Comments: Left anterior hip incision line healing well. No signs of acute infection.    Neurological:      Mental Status: She is alert and oriented to person, place, and time.      Gait: Gait is intact.   Psychiatric:         Mood and Affect: Affect normal.         Judgment: Judgment normal.       Assessment and Plan:     The following treatment plan was discussed:    1. Essential hypertension  - This is a chronic uncontrolled condition.  - Plan: Start on 10 mg of Lisinopril daily. Patient has been educated on " the use and potential side effect profile of this medication. Stressed the importance of monitoring blood pressure at home. Instructed to monitor blood pressure 2 times a day for at least 3 days out of the week. Once in the a.m. and once in the p.m. continue keeping a diary of blood pressure readings. She has been instructed to send me her readings in 3 weeks for review.  - lisinopril (PRINIVIL) 10 MG Tab; Take 1 tablet by mouth every day.  Dispense: 30 tablet; Refill: 2     Any change or worsening of signs or symptoms, patient encouraged to follow-up or report to emergency room for further evaluation. Patient verbalizes understanding and agrees.    Follow-Up: Return if symptoms worsen or fail to improve.      PLEASE NOTE: This dictation was created using voice recognition software. I have made every reasonable attempt to correct obvious errors, but I expect that there are errors of grammar and possibly content that I did not discover before finalizing the note.

## 2021-06-02 DIAGNOSIS — I10 ESSENTIAL HYPERTENSION: ICD-10-CM

## 2021-06-02 RX ORDER — LISINOPRIL 10 MG/1
10 TABLET ORAL DAILY
Qty: 30 TABLET | Refills: 2 | Status: SHIPPED | OUTPATIENT
Start: 2021-06-02 | End: 2021-07-20

## 2021-07-20 DIAGNOSIS — I10 ESSENTIAL HYPERTENSION: ICD-10-CM

## 2021-07-20 RX ORDER — LISINOPRIL 20 MG/1
20 TABLET ORAL DAILY
Qty: 30 TABLET | Refills: 1 | Status: SHIPPED | OUTPATIENT
Start: 2021-07-20 | End: 2021-10-20 | Stop reason: SDUPTHER

## 2021-07-29 DIAGNOSIS — M54.32 LEFT SIDED SCIATICA: ICD-10-CM

## 2021-07-29 DIAGNOSIS — M16.12 PRIMARY OSTEOARTHRITIS OF LEFT HIP: ICD-10-CM

## 2021-08-02 RX ORDER — GABAPENTIN 300 MG/1
CAPSULE ORAL
Qty: 90 CAPSULE | Refills: 5 | Status: SHIPPED | OUTPATIENT
Start: 2021-08-02 | End: 2022-11-11 | Stop reason: SDUPTHER

## 2021-08-09 PROBLEM — M16.11 PRIMARY OSTEOARTHRITIS OF RIGHT HIP: Status: ACTIVE | Noted: 2021-08-09

## 2021-09-17 ENCOUNTER — HOSPITAL ENCOUNTER (OUTPATIENT)
Dept: HOSPITAL 8 - OUT | Age: 61
Discharge: HOME | End: 2021-09-17
Attending: ORTHOPAEDIC SURGERY
Payer: MEDICAID

## 2021-09-17 VITALS — DIASTOLIC BLOOD PRESSURE: 83 MMHG | SYSTOLIC BLOOD PRESSURE: 143 MMHG

## 2021-09-17 VITALS — BODY MASS INDEX: 29.55 KG/M2 | WEIGHT: 183.87 LBS | HEIGHT: 66 IN

## 2021-09-17 DIAGNOSIS — I10: ICD-10-CM

## 2021-09-17 DIAGNOSIS — M16.11: Primary | ICD-10-CM

## 2021-09-17 DIAGNOSIS — M25.751: ICD-10-CM

## 2021-09-17 DIAGNOSIS — Z79.899: ICD-10-CM

## 2021-09-17 DIAGNOSIS — Z20.822: ICD-10-CM

## 2021-09-17 LAB
ALBUMIN SERPL-MCNC: 4 G/DL (ref 3.4–5)
ALP SERPL-CCNC: 115 U/L (ref 45–117)
ALT SERPL-CCNC: 28 U/L (ref 12–78)
ANION GAP SERPL CALC-SCNC: 6 MMOL/L (ref 5–15)
BILIRUB SERPL-MCNC: 0.2 MG/DL (ref 0.2–1)
CALCIUM SERPL-MCNC: 9.7 MG/DL (ref 8.5–10.1)
CHLORIDE SERPL-SCNC: 105 MMOL/L (ref 98–107)
CREAT SERPL-MCNC: 0.97 MG/DL (ref 0.55–1.02)
PROT SERPL-MCNC: 7.3 G/DL (ref 6.4–8.2)

## 2021-09-17 PROCEDURE — 80053 COMPREHEN METABOLIC PANEL: CPT

## 2021-09-17 PROCEDURE — C1713 ANCHOR/SCREW BN/BN,TIS/BN: HCPCS

## 2021-09-17 PROCEDURE — C1776 JOINT DEVICE (IMPLANTABLE): HCPCS

## 2021-09-17 PROCEDURE — 27130 TOTAL HIP ARTHROPLASTY: CPT

## 2021-09-17 PROCEDURE — 76000 FLUOROSCOPY <1 HR PHYS/QHP: CPT

## 2021-09-17 PROCEDURE — 73501 X-RAY EXAM HIP UNI 1 VIEW: CPT

## 2021-09-17 PROCEDURE — 87635 SARS-COV-2 COVID-19 AMP PRB: CPT

## 2021-09-17 PROCEDURE — 36415 COLL VENOUS BLD VENIPUNCTURE: CPT

## 2021-09-17 PROCEDURE — 72170 X-RAY EXAM OF PELVIS: CPT

## 2021-09-17 PROCEDURE — 97162 PT EVAL MOD COMPLEX 30 MIN: CPT

## 2021-09-17 PROCEDURE — 97166 OT EVAL MOD COMPLEX 45 MIN: CPT

## 2021-09-17 PROCEDURE — 97116 GAIT TRAINING THERAPY: CPT

## 2021-09-17 PROCEDURE — 93005 ELECTROCARDIOGRAM TRACING: CPT

## 2021-09-17 RX ADMIN — HYDROMORPHONE HYDROCHLORIDE PRN MG: 1 INJECTION, SOLUTION INTRAMUSCULAR; INTRAVENOUS; SUBCUTANEOUS at 10:55

## 2021-09-17 RX ADMIN — FENTANYL CITRATE PRN MCG: 50 INJECTION INTRAMUSCULAR; INTRAVENOUS at 10:20

## 2021-09-17 RX ADMIN — HYDROMORPHONE HYDROCHLORIDE PRN MG: 1 INJECTION, SOLUTION INTRAMUSCULAR; INTRAVENOUS; SUBCUTANEOUS at 11:10

## 2021-09-17 RX ADMIN — FENTANYL CITRATE PRN MCG: 50 INJECTION INTRAMUSCULAR; INTRAVENOUS at 10:14

## 2021-09-17 RX ADMIN — FENTANYL CITRATE PRN MCG: 50 INJECTION INTRAMUSCULAR; INTRAVENOUS at 10:22

## 2021-09-17 RX ADMIN — FENTANYL CITRATE PRN MCG: 50 INJECTION INTRAMUSCULAR; INTRAVENOUS at 10:27

## 2021-09-17 RX ADMIN — HYDROMORPHONE HYDROCHLORIDE PRN MG: 1 INJECTION, SOLUTION INTRAMUSCULAR; INTRAVENOUS; SUBCUTANEOUS at 11:00

## 2021-10-20 DIAGNOSIS — I10 ESSENTIAL HYPERTENSION: ICD-10-CM

## 2021-10-20 RX ORDER — LISINOPRIL 20 MG/1
20 TABLET ORAL DAILY
Qty: 30 TABLET | Refills: 1 | Status: SHIPPED | OUTPATIENT
Start: 2021-10-20 | End: 2022-02-15

## 2022-02-01 ENCOUNTER — HOSPITAL ENCOUNTER (OUTPATIENT)
Facility: MEDICAL CENTER | Age: 62
End: 2022-02-01
Payer: MEDICAID

## 2022-02-01 ENCOUNTER — GYNECOLOGY VISIT (OUTPATIENT)
Dept: OBGYN | Facility: CLINIC | Age: 62
End: 2022-02-01
Payer: MEDICAID

## 2022-02-01 VITALS
WEIGHT: 200 LBS | SYSTOLIC BLOOD PRESSURE: 142 MMHG | HEIGHT: 65 IN | BODY MASS INDEX: 33.32 KG/M2 | DIASTOLIC BLOOD PRESSURE: 84 MMHG

## 2022-02-01 DIAGNOSIS — N90.89 VULVAR IRRITATION: ICD-10-CM

## 2022-02-01 DIAGNOSIS — Z01.419 WOMEN'S ANNUAL ROUTINE GYNECOLOGICAL EXAMINATION: Primary | ICD-10-CM

## 2022-02-01 DIAGNOSIS — R73.03 PREDIABETES: ICD-10-CM

## 2022-02-01 DIAGNOSIS — I10 ESSENTIAL HYPERTENSION: ICD-10-CM

## 2022-02-01 DIAGNOSIS — F17.200 SMOKING: ICD-10-CM

## 2022-02-01 DIAGNOSIS — F10.10 ALCOHOL ABUSE: ICD-10-CM

## 2022-02-01 PROCEDURE — 87480 CANDIDA DNA DIR PROBE: CPT

## 2022-02-01 PROCEDURE — 99203 OFFICE O/P NEW LOW 30 MIN: CPT | Mod: 25

## 2022-02-01 PROCEDURE — 87591 N.GONORRHOEAE DNA AMP PROB: CPT

## 2022-02-01 PROCEDURE — 87491 CHLMYD TRACH DNA AMP PROBE: CPT

## 2022-02-01 PROCEDURE — 87510 GARDNER VAG DNA DIR PROBE: CPT

## 2022-02-01 PROCEDURE — G0101 CA SCREEN;PELVIC/BREAST EXAM: HCPCS

## 2022-02-01 PROCEDURE — 87660 TRICHOMONAS VAGIN DIR PROBE: CPT

## 2022-02-01 RX ORDER — ESTRADIOL 0.1 MG/G
0.4 CREAM VAGINAL
Qty: 42.5 G | Refills: 3 | Status: SHIPPED | OUTPATIENT
Start: 2022-02-01 | End: 2023-09-20

## 2022-02-01 NOTE — PROGRESS NOTES
" Gita Hillsgwick61 y.o. No obstetric history on file. female presents for Annual Well Woman Exam.     Subjective:     Patient is currently with complaints of external vulvar irritation. Patient is s/p hysterectomy with total cervical removal in 2008.   Menopausal symptoms: vaginal dryness: severity = moderate      Pt is not sexually active with a partner.  Currently using n/a for birth control - s/p hysterectomy    ROS: Patient is feeling well. No dyspnea or chest pain on exertion. No Abdominal pain, change in bowel habits, black or bloody stools. No urinary sx. GYN ROS:no breast pain or new or enlarging lumps on self exam, no vaginal bleeding, no discharge or pelvic pain, she complains of some vulvar discomfort. Denies breast tenderness, mass, discharge, changes in size or contour, or abnormal cyclic discomfort. Reports hx of breast biopsy to the right breast in the 1980's that was normal.  No neurological complaints.    Past Medical History:   Diagnosis Date   • Arthritis    • Diabetes (HCC)     pre-diabetic   • Head ache    • Hypercholesterolemia    • Hypertension    • Kidney disease    • Prediabetes    • Seizure (HCC)     hx of Epilepsy   • Substance abuse (HCC)        Allergy:   Patient has no known allergies.    LMP:       No LMP recorded. Patient has had a hysterectomy. .     Menstrual History: postmenopausal, s/p hysterectomy          Objective :   Pap history, the patient denies abnormal Pap smears and denies   sexually transmitted diseases    Mammo: due for screening mammography, last was WNL in 2018    The patient appears well, poor historian for medical history or current concerns. Unable to localize discomfort or provide details about medical history. She was alert and oriented x 3, in no acute distress. Demonstrated some flight of ideas and forgetfulness.   Ht 1.651 m (5' 5\")   Wt 90.7 kg (200 lb)   HEENT Exam: EOMI, no adenopathy or thyromegaly.  Lungs: Clear to Auscultation   Cor: S1 and S2 " normal, no murmurs, or rubs   Abdomen: Soft without tenderness, guarding mass or organomegaly.  Extremities: No edema  Neurological: Normal No focal signs  Breast Exam:Inspection negative. No nipple discharge or bleeding. No masses or nodularity palpable, noted small dimpled area inferior to the right areola which the patient reports is from her previous biopsy and has been there since without changes.   Pelvic: External genitalia,urethral meatus, urethra, bladder and vagina normal. Some atrophic tissue noted, c/w postmenopause. No excoriation, redness, or discharge noted. Adnexa intact and normal.  Anus and perineum normal.   Pap: n/a    Lab:No results found for this or any previous visit (from the past 336 hour(s)).    Assessment:    1. well woman  2. no contraindication to continue vaginal estrace hormonal therapy  Encounter Diagnoses   Name Primary?   • Women's annual routine gynecological examination Yes   • Alcohol abuse    • Smoking    • Prediabetes    • Essential hypertension    • Vulvar irritation          Plan:  Vaginal pathogen swab to r/o infectious processes causing vulvar discomfort.   Mammogram  Screening colonoscopy  Bone mineral density screening    Patient counseling: breast self exam/breast awareness, mammography screening, STD prevention, HIV risk factors and prevention, menopause, osteoporosis and adequate intake of calcium and vitamin D    return annually or prn  Safe Sex/STD Prevention  Smoking Cessation  Self Breast Exams  Calcium Supplements  Contraception:hysterectomy, postmenopause     SARAH SolNLANE.

## 2022-02-01 NOTE — PROGRESS NOTES
Patient here for New patient GYN for possible infection.  Last mammo 2018   # [phone]  Pharmacy verified   Pt states was told a bladder infection, vaginal area is irritated, some itching.

## 2022-02-02 LAB
C TRACH DNA SPEC QL NAA+PROBE: NEGATIVE
CANDIDA DNA VAG QL PROBE+SIG AMP: NEGATIVE
G VAGINALIS DNA VAG QL PROBE+SIG AMP: POSITIVE
N GONORRHOEA DNA SPEC QL NAA+PROBE: NEGATIVE
SPECIMEN SOURCE: NORMAL
T VAGINALIS DNA VAG QL PROBE+SIG AMP: NEGATIVE

## 2022-02-03 RX ORDER — CLINDAMYCIN HYDROCHLORIDE 300 MG/1
300 CAPSULE ORAL 2 TIMES DAILY
Qty: 14 CAPSULE | Refills: 0 | Status: SHIPPED | OUTPATIENT
Start: 2022-02-03 | End: 2022-02-10

## 2022-02-03 NOTE — PROGRESS NOTES
Per Shira Wilburn' previous labs, pt needs tx for BV - is an alcoholic, so will avoid Flagyl and give Clindamycin 300mg BID x 7 days per protocol.

## 2022-02-04 ENCOUNTER — TELEPHONE (OUTPATIENT)
Dept: OBGYN | Facility: CLINIC | Age: 62
End: 2022-02-04

## 2022-02-04 NOTE — TELEPHONE ENCOUNTER
----- Message from Shira Wilburn C.N.M. sent at 2/3/2022 11:24 AM PST -----  Pt needs tx - Clindamycin 300mg BID x 7 days rx called in today. Please notify pt.       Called pt and informed her test came back positive for BV, explained this is not an STI. Pt informed she needs to start antibiotics. Should take the full Tx and advised to take meds with food and to avoid intercourse while on Tx. Pharmacy verified with pt. Pt agreed and verbalized understanding.

## 2022-02-28 DIAGNOSIS — E78.49 OTHER HYPERLIPIDEMIA: ICD-10-CM

## 2022-03-01 RX ORDER — ATORVASTATIN CALCIUM 40 MG/1
TABLET, FILM COATED ORAL
Qty: 30 TABLET | Refills: 8 | Status: SHIPPED | OUTPATIENT
Start: 2022-03-01 | End: 2022-12-07 | Stop reason: SDUPTHER

## 2022-03-14 ENCOUNTER — APPOINTMENT (OUTPATIENT)
Dept: RADIOLOGY | Facility: MEDICAL CENTER | Age: 62
End: 2022-03-14
Payer: MEDICAID

## 2022-03-15 ENCOUNTER — HOSPITAL ENCOUNTER (OUTPATIENT)
Dept: RADIOLOGY | Facility: MEDICAL CENTER | Age: 62
End: 2022-03-15
Payer: MEDICAID

## 2022-03-15 DIAGNOSIS — Z01.419 WOMEN'S ANNUAL ROUTINE GYNECOLOGICAL EXAMINATION: ICD-10-CM

## 2022-03-15 PROCEDURE — 77063 BREAST TOMOSYNTHESIS BI: CPT

## 2022-03-16 ENCOUNTER — TELEPHONE (OUTPATIENT)
Dept: OBGYN | Facility: CLINIC | Age: 62
End: 2022-03-16

## 2022-03-16 NOTE — TELEPHONE ENCOUNTER
I contacted pt. and informed of provider's note    Pt also also asked if we are working on her Colonoscopy referral.   I checked under referral but no referral was place except an imaging order. I told pt that I will check with Shira tomorrow when she is back in the office regarding the referral and give her a call back.  I also provided [facility] telephone number to pt incase a referral is not needed with them.     ----- Message from Shira Wilburn C.N.M. sent at 3/15/2022  9:49 PM PDT -----  Please call Gita and let her know that her screening mammogram was normal. They recommend a follow up in 1 year.     Thanks

## 2022-03-17 ENCOUNTER — TELEPHONE (OUTPATIENT)
Dept: OBGYN | Facility: CLINIC | Age: 62
End: 2022-03-17
Payer: MEDICAID

## 2022-03-17 DIAGNOSIS — Z01.419 WOMEN'S ANNUAL ROUTINE GYNECOLOGICAL EXAMINATION: ICD-10-CM

## 2022-03-17 NOTE — TELEPHONE ENCOUNTER
Called pt and spoke with her. Informed pt that Shira Izquierdo C.N.M. placed the referral for her colonoscopy today. To give it about a week for the GI office to contact her and help her schedule her appt. No further questions.

## 2022-03-17 NOTE — TELEPHONE ENCOUNTER
No answer left message for pt to call back.    ----- Message from Shira Wilburn C.N.M. sent at 3/15/2022  9:49 PM PDT -----  Please call Gita and let her know that her screening mammogram was normal. They recommend a follow up in 1 year.     Thanks

## 2022-03-24 ENCOUNTER — OFFICE VISIT (OUTPATIENT)
Dept: MEDICAL GROUP | Facility: MEDICAL CENTER | Age: 62
End: 2022-03-24
Attending: INTERNAL MEDICINE
Payer: MEDICAID

## 2022-03-24 VITALS
HEIGHT: 65 IN | OXYGEN SATURATION: 98 % | DIASTOLIC BLOOD PRESSURE: 90 MMHG | WEIGHT: 204 LBS | HEART RATE: 78 BPM | SYSTOLIC BLOOD PRESSURE: 140 MMHG | BODY MASS INDEX: 33.99 KG/M2 | RESPIRATION RATE: 16 BRPM | TEMPERATURE: 97.1 F

## 2022-03-24 DIAGNOSIS — I10 ESSENTIAL HYPERTENSION: ICD-10-CM

## 2022-03-24 DIAGNOSIS — E78.49 OTHER HYPERLIPIDEMIA: ICD-10-CM

## 2022-03-24 DIAGNOSIS — R73.03 PREDIABETES: ICD-10-CM

## 2022-03-24 DIAGNOSIS — M79.674 PAIN OF TOE OF RIGHT FOOT: ICD-10-CM

## 2022-03-24 DIAGNOSIS — Z78.0 POSTMENOPAUSAL: ICD-10-CM

## 2022-03-24 DIAGNOSIS — K76.0 FATTY LIVER: ICD-10-CM

## 2022-03-24 PROBLEM — M75.52 BURSITIS OF LEFT SHOULDER: Status: RESOLVED | Noted: 2019-11-25 | Resolved: 2022-03-24

## 2022-03-24 PROBLEM — L02.439 CARBUNCLE AND FURUNCLE OF LEG: Status: RESOLVED | Noted: 2019-01-31 | Resolved: 2022-03-24

## 2022-03-24 PROBLEM — L02.429 CARBUNCLE AND FURUNCLE OF LEG: Status: RESOLVED | Noted: 2019-01-31 | Resolved: 2022-03-24

## 2022-03-24 PROCEDURE — 99214 OFFICE O/P EST MOD 30 MIN: CPT | Performed by: INTERNAL MEDICINE

## 2022-03-24 PROCEDURE — 99212 OFFICE O/P EST SF 10 MIN: CPT | Performed by: INTERNAL MEDICINE

## 2022-03-24 NOTE — ASSESSMENT & PLAN NOTE
Currently taking lisinopril 20 mg daily.  Blood pressure is mildly elevated in clinic today.  She is due for updated labs.

## 2022-03-24 NOTE — ASSESSMENT & PLAN NOTE
Reports that she has not had any blood work done for at least a year.  She is interested in following up on her blood sugars.  States that she still likes to eat a lot of ice cream.

## 2022-03-24 NOTE — PROGRESS NOTES
Subjective:   Gita Pack is a 61 y.o. female here today for toe pain, labs    Pain of toe of right foot  She reports dropping a 4 x 4 x 8 foot board on her toe 2 days ago.  Initially it was very tender.  It remains very bruised and swollen.  She is walking on the side of her foot and has not tried weightbearing on the toe due to pain.  She reports it is not tender if she is not bearing weight.  She did not seek medical care immediately following the injury and has not had any imaging of the hip.    Prediabetes  Reports that she has not had any blood work done for at least a year.  She is interested in following up on her blood sugars.  States that she still likes to eat a lot of ice cream.    Hyperlipidemia  Currently taking atorvastatin 40 mg daily.  Is due for updated labs.    Essential hypertension  Currently taking lisinopril 20 mg daily.  Blood pressure is mildly elevated in clinic today.  She is due for updated labs.       Current medicines (including changes today)  Current Outpatient Medications   Medication Sig Dispense Refill   • atorvastatin (LIPITOR) 40 MG Tab TAKE 1 TABLET BY MOUTH EVERY DAY 30 Tablet 8   • lisinopril (PRINIVIL) 20 MG Tab Take 1 tablet by mouth once daily 30 Tablet 5   • estradiol (ESTRACE) 0.1 MG/GM vaginal cream Insert 0.4 g into the vagina every 3 days. 42.5 g 3   • meloxicam (MOBIC) 7.5 MG Tab TAKE 1 TABLET BY MOUTH EVERY DAY 30 Tablet 1   • gabapentin (NEURONTIN) 300 MG Cap TAKE 1 CAPSULE BY MOUTH THREE TIMES A DAY 90 capsule 5   • mupirocin (BACTROBAN) 2 % Ointment mupirocin 2 % topical ointment     • nicotine (NICODERM) 7 MG/24HR PATCH 24 HR NICOTINE 7 MG/24HR PT24     • sulfamethoxazole-trimethoprim (BACTRIM DS) 800-160 MG tablet sulfamethoxazole 800 mg-trimethoprim 160 mg tablet     • nicotine polacrilex (NICORETTE) 2 MG Gum nicotine (polacrilex) 2 mg gum     • SODIUM FLUORIDE, DENTAL GEL, 1.1 % Gel PreviDent 5000 Booster Plus 1.1 % dental paste   USE A PEA SIZED  AMOUNT OF TOOTHPASTE WHEN BRUSHING -USE 2X DAILY AM AND PM       No current facility-administered medications for this visit.     She  has a past medical history of Arthritis, Diabetes ([facility]), Head ache, Hypercholesterolemia, Hypertension, Kidney disease, Prediabetes, Seizure ([facility]), and Substance abuse ([facility]).         Objective:     Vitals:    03/24/22 1342   BP: 140/90   Pulse: 78   Resp: 16   Temp: 36.2 °C (97.1 °F)   SpO2: 98%     Body mass index is 33.95 kg/m².   Physical Exam:  Constitutional: Alert, no distress.  Skin: Warm, dry, good turgor, no rashes in visible areas.  Eye: Equal, round and reactive, conjunctiva clear, lids normal.  Extrem: Great toe on right foot is extremely bruised with a small blister on top, mildly reduced flexion and extension of the toe secondary to pain and mild tenderness to palpation over the area of injury      Assessment and Plan:   The following treatment plan was discussed    1. Pain of toe of right foot  We discussed obtaining an x-ray of the foot immediately to determine if the toe is broken.  If so, would consider orthopedic E consult for management  - DX-FOOT-COMPLETE 3+ RIGHT; Future    2. Prediabetes  We will obtain updated labs  - HEMOGLOBIN A1C; Future    3. Fatty liver  We will obtain updated labs  - Comp Metabolic Panel; Future    4. Essential hypertension  We will obtain updated labs  -cont lisinopril 20 mg daily  - Comp Metabolic Panel; Future    5. Other hyperlipidemia  We will obtain updated labs  -cont atorvastatin 40 mg daily  - Lipid Profile; Future    6. Postmenopausal  - VITAMIN D,25 HYDROXY; Future        Followup: Return if symptoms worsen or fail to improve.

## 2022-03-24 NOTE — ASSESSMENT & PLAN NOTE
She reports dropping a 4 x 4 x 8 foot board on her toe 2 days ago.  Initially it was very tender.  It remains very bruised and swollen.  She is walking on the side of her foot and has not tried weightbearing on the toe due to pain.  She reports it is not tender if she is not bearing weight.  She did not seek medical care immediately following the injury and has not had any imaging of the hip.

## 2022-03-25 ENCOUNTER — HOSPITAL ENCOUNTER (OUTPATIENT)
Dept: LAB | Facility: MEDICAL CENTER | Age: 62
End: 2022-03-25
Attending: INTERNAL MEDICINE
Payer: MEDICAID

## 2022-03-25 ENCOUNTER — HOSPITAL ENCOUNTER (OUTPATIENT)
Dept: RADIOLOGY | Facility: MEDICAL CENTER | Age: 62
End: 2022-03-25
Attending: INTERNAL MEDICINE
Payer: MEDICAID

## 2022-03-25 DIAGNOSIS — M79.674 PAIN OF TOE OF RIGHT FOOT: ICD-10-CM

## 2022-03-25 DIAGNOSIS — E78.49 OTHER HYPERLIPIDEMIA: ICD-10-CM

## 2022-03-25 DIAGNOSIS — Z78.0 POSTMENOPAUSAL: ICD-10-CM

## 2022-03-25 DIAGNOSIS — I10 ESSENTIAL HYPERTENSION: ICD-10-CM

## 2022-03-25 DIAGNOSIS — Z71.9 ENCOUNTER FOR CONSULTATION: ICD-10-CM

## 2022-03-25 DIAGNOSIS — K76.0 FATTY LIVER: ICD-10-CM

## 2022-03-25 DIAGNOSIS — R73.03 PREDIABETES: ICD-10-CM

## 2022-03-25 PROBLEM — S92.424D CLOSED NONDISPLACED FRACTURE OF DISTAL PHALANX OF RIGHT GREAT TOE WITH ROUTINE HEALING: Status: ACTIVE | Noted: 2022-03-24

## 2022-03-25 PROBLEM — S92.414D CLOSED NONDISPLACED FRACTURE OF PROXIMAL PHALANX OF RIGHT GREAT TOE WITH ROUTINE HEALING: Status: ACTIVE | Noted: 2022-03-24

## 2022-03-25 LAB
25(OH)D3 SERPL-MCNC: 29 NG/ML (ref 30–100)
ALBUMIN SERPL BCP-MCNC: 4.4 G/DL (ref 3.2–4.9)
ALBUMIN/GLOB SERPL: 2.3 G/DL
ALP SERPL-CCNC: 108 U/L (ref 30–99)
ALT SERPL-CCNC: 20 U/L (ref 2–50)
ANION GAP SERPL CALC-SCNC: 13 MMOL/L (ref 7–16)
AST SERPL-CCNC: 20 U/L (ref 12–45)
BILIRUB SERPL-MCNC: 0.6 MG/DL (ref 0.1–1.5)
BUN SERPL-MCNC: 15 MG/DL (ref 8–22)
CALCIUM SERPL-MCNC: 9.3 MG/DL (ref 8.5–10.5)
CHLORIDE SERPL-SCNC: 104 MMOL/L (ref 96–112)
CHOLEST SERPL-MCNC: 165 MG/DL (ref 100–199)
CO2 SERPL-SCNC: 23 MMOL/L (ref 20–33)
CREAT SERPL-MCNC: 0.73 MG/DL (ref 0.5–1.4)
EST. AVERAGE GLUCOSE BLD GHB EST-MCNC: 137 MG/DL
GFR SERPLBLD CREATININE-BSD FMLA CKD-EPI: 93 ML/MIN/1.73 M 2
GLOBULIN SER CALC-MCNC: 1.9 G/DL (ref 1.9–3.5)
GLUCOSE SERPL-MCNC: 80 MG/DL (ref 65–99)
HBA1C MFR BLD: 6.4 % (ref 4–5.6)
HDLC SERPL-MCNC: 58 MG/DL
LDLC SERPL CALC-MCNC: 95 MG/DL
POTASSIUM SERPL-SCNC: 4.6 MMOL/L (ref 3.6–5.5)
PROT SERPL-MCNC: 6.3 G/DL (ref 6–8.2)
SODIUM SERPL-SCNC: 140 MMOL/L (ref 135–145)
TRIGL SERPL-MCNC: 61 MG/DL (ref 0–149)

## 2022-03-25 PROCEDURE — 83036 HEMOGLOBIN GLYCOSYLATED A1C: CPT

## 2022-03-25 PROCEDURE — 36415 COLL VENOUS BLD VENIPUNCTURE: CPT

## 2022-03-25 PROCEDURE — 80061 LIPID PANEL: CPT

## 2022-03-25 PROCEDURE — 73630 X-RAY EXAM OF FOOT: CPT | Mod: RT

## 2022-03-25 PROCEDURE — 80053 COMPREHEN METABOLIC PANEL: CPT

## 2022-03-25 PROCEDURE — 82306 VITAMIN D 25 HYDROXY: CPT

## 2022-03-29 ENCOUNTER — TELEPHONE (OUTPATIENT)
Dept: MEDICAL GROUP | Facility: MEDICAL CENTER | Age: 62
End: 2022-03-29
Payer: MEDICAID

## 2022-03-29 NOTE — TELEPHONE ENCOUNTER
----- Message from Margret Foster M.D. sent at 3/25/2022  5:38 PM PDT -----  Please let patient know that her x-ray shows that she did break her toe.  I would like her to dustin tape her big toe to her second toe to provide some stability.  Please let her know that I will reach out to an orthopedic specialist to see if they would recommend she wears a boot or other specialized footwear and if they would like to see her in clinic.    Margret Foster M.D.

## 2022-03-29 NOTE — TELEPHONE ENCOUNTER
Phone Number Called: [phone] (home)       Call outcome: Did not leave a detailed message. Requested patient to call back.    Message: lvm for pt to call back regarding x-ray results

## 2022-03-30 ENCOUNTER — TELEPHONE (OUTPATIENT)
Dept: MEDICAL GROUP | Facility: MEDICAL CENTER | Age: 62
End: 2022-03-30
Payer: MEDICAID

## 2022-03-30 DIAGNOSIS — S92.424D CLOSED NONDISPLACED FRACTURE OF DISTAL PHALANX OF RIGHT GREAT TOE WITH ROUTINE HEALING: ICD-10-CM

## 2022-03-30 NOTE — TELEPHONE ENCOUNTER
VOICEMAIL  1. Caller Name: Gita Pack       Call Back Number: [phone] (home)       2. Message: Patient is requesting lab results and imaging results.    3. Patient approves office to leave a detailed voicemail/MyChart message: yes

## 2022-03-31 NOTE — TELEPHONE ENCOUNTER
Patient has been informed understood no following questions. There no number for orthopedics are you going to place a referral for orthopedics?

## 2022-04-01 NOTE — TELEPHONE ENCOUNTER
I submitted an E consult but I have not heard back from anybody yet.  I will put in a referral for orthopedics.    Margret Foster M.D.

## 2022-04-01 NOTE — TELEPHONE ENCOUNTER
Please let her know that her labs show a slightly low vitamin D level at 29.  We consider 30 and above to be normal.  Would recommend that she starts an over-the-counter vitamin D supplement 1000 units daily.  Her cholesterol is within the normal range.  Her blood sugars are elevated putting her in the prediabetic range.  I recommend she follow-up with Jennifer to discuss treatment for this as she is very close to becoming diabetic.    Margret Foster M.D.

## 2022-04-14 ENCOUNTER — OFFICE VISIT (OUTPATIENT)
Dept: MEDICAL GROUP | Facility: MEDICAL CENTER | Age: 62
End: 2022-04-14
Attending: PHYSICIAN ASSISTANT
Payer: MEDICAID

## 2022-04-14 VITALS
HEIGHT: 65 IN | RESPIRATION RATE: 17 BRPM | BODY MASS INDEX: 33.72 KG/M2 | WEIGHT: 202.4 LBS | OXYGEN SATURATION: 97 % | SYSTOLIC BLOOD PRESSURE: 140 MMHG | DIASTOLIC BLOOD PRESSURE: 62 MMHG | HEART RATE: 85 BPM | TEMPERATURE: 97.6 F

## 2022-04-14 DIAGNOSIS — R05.3 PERSISTENT COUGH FOR 3 WEEKS OR LONGER: ICD-10-CM

## 2022-04-14 DIAGNOSIS — F17.200 CURRENT SMOKER: ICD-10-CM

## 2022-04-14 DIAGNOSIS — R06.02 SOB (SHORTNESS OF BREATH): ICD-10-CM

## 2022-04-14 PROBLEM — R05.8 NON-PRODUCTIVE COUGH: Status: ACTIVE | Noted: 2022-04-14

## 2022-04-14 PROCEDURE — 99213 OFFICE O/P EST LOW 20 MIN: CPT | Performed by: PHYSICIAN ASSISTANT

## 2022-04-14 PROCEDURE — 99212 OFFICE O/P EST SF 10 MIN: CPT | Performed by: PHYSICIAN ASSISTANT

## 2022-04-14 RX ORDER — AZITHROMYCIN 250 MG/1
TABLET, FILM COATED ORAL
Qty: 6 TABLET | Refills: 0 | Status: SHIPPED | OUTPATIENT
Start: 2022-04-14 | End: 2022-06-21

## 2022-04-14 RX ORDER — METHYLPREDNISOLONE 4 MG/1
TABLET ORAL
Qty: 21 TABLET | Refills: 0 | Status: SHIPPED | OUTPATIENT
Start: 2022-04-14 | End: 2022-06-21 | Stop reason: SDUPTHER

## 2022-04-14 NOTE — ASSESSMENT & PLAN NOTE
Onset/NANCIE: 2-3 weeks ago.   Location: Upper respiratory.   Duration: Persistent.   Character: Non productive persistent cough. Causing discomfort to her rib cage.   Aggravating factors: None.   Alleviating factors: None.   Radiation: None.   Treatments tried: None.   Severity: None.   No red flag signs.

## 2022-04-14 NOTE — PROGRESS NOTES
Chief Complaint   Patient presents with   • Follow-Up     Subjective:     HPI:   Gita Pack is a 61 y.o. female here to discuss the evaluation and management of:    Non-productive cough  Onset/NANCIE: 2-3 weeks ago.   Location: Upper respiratory.   Duration: Persistent.   Character: Non productive persistent cough. Causing discomfort to her rib cage.   Aggravating factors: None.   Alleviating factors: None.   Radiation: None.   Treatments tried: None.   Severity: None.   No red flag signs.     ROS  See HPI.     No Known Allergies    Current medicines (including changes today)  Current Outpatient Medications   Medication Sig Dispense Refill   • methylPREDNISolone (MEDROL DOSEPAK) 4 MG Tablet Therapy Pack Follow package instructions. 21 Tablet 0   • azithromycin (ZITHROMAX) 250 MG Tab Take 500 mg orally on day one. Then, 250 mg for  Days 2-5. 6 Tablet 0   • meloxicam (MOBIC) 7.5 MG Tab TAKE 1 TABLET BY MOUTH EVERY DAY 30 Tablet 1   • atorvastatin (LIPITOR) 40 MG Tab TAKE 1 TABLET BY MOUTH EVERY DAY 30 Tablet 8   • lisinopril (PRINIVIL) 20 MG Tab Take 1 tablet by mouth once daily 30 Tablet 5   • gabapentin (NEURONTIN) 300 MG Cap TAKE 1 CAPSULE BY MOUTH THREE TIMES A DAY 90 capsule 5   • estradiol (ESTRACE) 0.1 MG/GM vaginal cream Insert 0.4 g into the vagina every 3 days. 42.5 g 3   • mupirocin (BACTROBAN) 2 % Ointment mupirocin 2 % topical ointment     • nicotine (NICODERM) 7 MG/24HR PATCH 24 HR NICOTINE 7 MG/24HR PT24     • sulfamethoxazole-trimethoprim (BACTRIM DS) 800-160 MG tablet sulfamethoxazole 800 mg-trimethoprim 160 mg tablet     • nicotine polacrilex (NICORETTE) 2 MG Gum nicotine (polacrilex) 2 mg gum     • SODIUM FLUORIDE, DENTAL GEL, 1.1 % Gel PreviDent 5000 Booster Plus 1.1 % dental paste   USE A PEA SIZED AMOUNT OF TOOTHPASTE WHEN BRUSHING -USE 2X DAILY AM AND PM       No current facility-administered medications for this visit.       Social History     Tobacco Use   • Smoking status: Current Every  "Day Smoker     Packs/day: 0.50     Years: 40.00     Pack years: 20.00     Types: Cigarettes   • Smokeless tobacco: Never Used   • Tobacco comment: Pt states smokes about 1 pack a day    Vaping Use   • Vaping Use: Never used   Substance Use Topics   • Alcohol use: Yes     Comment: 3 drinks (mixed drinks with vodka)/day in evening    • Drug use: No       Patient Active Problem List    Diagnosis Date Noted   • Non-productive cough 04/14/2022   • Closed nondisplaced fracture of distal phalanx of right great toe with routine healing 03/24/2022   • Primary osteoarthritis of right hip 08/09/2021   • Essential hypertension 04/06/2021   • Primary osteoarthritis of left hip 10/19/2020   • Fatty liver 03/06/2020   • Neuropathy 01/03/2020   • Pain in left hip 01/03/2020   • Alcohol abuse 11/25/2019   • Prediabetes 11/25/2019   • Hyperlipidemia 11/11/2019   • Smoking 11/11/2019   • Neck pain 01/31/2019       Family History   Problem Relation Age of Onset   • Hyperlipidemia Mother    • Diabetes Mother    • Diabetes Father    • Hyperlipidemia Father    • Heart Disease Father    • Cancer Maternal Grandmother    • Hyperlipidemia Maternal Grandmother    • No Known Problems Sister         Objective:     /62 (BP Location: Left arm, Patient Position: Sitting, BP Cuff Size: Adult)   Pulse 85   Temp 36.4 °C (97.6 °F) (Skin)   Resp 17   Ht 1.651 m (5' 5\")   Wt 91.8 kg (202 lb 6.4 oz)   SpO2 97%  Body mass index is 33.68 kg/m².    Physical Exam:  Physical Exam  Vitals reviewed.   Constitutional:       Appearance: Normal appearance.   HENT:      Right Ear: External ear normal.      Left Ear: External ear normal.   Eyes:      Pupils: Pupils are equal, round, and reactive to light.   Cardiovascular:      Rate and Rhythm: Normal rate and regular rhythm.      Heart sounds: Normal heart sounds.   Pulmonary:      Effort: Pulmonary effort is normal.      Breath sounds: Examination of the right-upper field reveals wheezing. Examination " of the left-upper field reveals wheezing. Examination of the right-middle field reveals wheezing. Examination of the left-middle field reveals wheezing. Examination of the right-lower field reveals wheezing. Examination of the left-lower field reveals wheezing. Wheezing present.   Neurological:      Mental Status: She is alert.   Psychiatric:         Mood and Affect: Mood normal.         Behavior: Behavior normal.       Assessment and Plan:     The following treatment plan was discussed:    1. Persistent cough for 3 weeks or longer, SOB (shortness of breath), Current smoker  - This is an acute condition.  - Plan: Start on a combination of a Z-torsten and Medrol dose torsten. Patient has been educated on the use and potential side effects of this medication.  We will also plan to obtain a PFT based on chronicity of cough, shortness of breath and long history of smoking.  Follow-up after completion of PFT.  - methylPREDNISolone (MEDROL DOSEPAK) 4 MG Tablet Therapy Pack; Follow package instructions.  Dispense: 21 Tablet; Refill: 0  - azithromycin (ZITHROMAX) 250 MG Tab; Take 500 mg orally on day one. Then, 250 mg for  Days 2-5.  Dispense: 6 Tablet; Refill: 0  - PULMONARY FUNCTION TESTS -Test requested: Complete Pulmonary Function Test; Include MIPS/MEPS? Yes; Future    Any change or worsening of signs or symptoms, patient encouraged to follow-up or report to emergency room for further evaluation. Patient verbalizes understanding and agrees.    Follow-Up: Return if symptoms worsen or fail to improve.    PLEASE NOTE: This dictation was created using voice recognition software. I have made every reasonable attempt to correct obvious errors, but I expect that there are errors of grammar and possibly content that I did not discover before finalizing the note.

## 2022-06-07 ENCOUNTER — APPOINTMENT (OUTPATIENT)
Dept: SLEEP MEDICINE | Facility: MEDICAL CENTER | Age: 62
End: 2022-06-07
Attending: PHYSICIAN ASSISTANT
Payer: MEDICAID

## 2022-06-13 ENCOUNTER — TELEPHONE (OUTPATIENT)
Dept: MEDICAL GROUP | Facility: MEDICAL CENTER | Age: 62
End: 2022-06-13
Payer: MEDICAID

## 2022-06-13 NOTE — TELEPHONE ENCOUNTER
VOICEMAIL  1. Caller Name: Gita Pack                        Call Back Number: [phone] (home)       2. Message: Left daughter a vm in regards her mom vm requesting a medication but pt did not leave the name of the medication she was requesting.    3. Patient approves office to leave a detailed voicemail/MyChart message: yes

## 2022-06-20 ENCOUNTER — TELEPHONE (OUTPATIENT)
Dept: MEDICAL GROUP | Facility: MEDICAL CENTER | Age: 62
End: 2022-06-20
Payer: MEDICAID

## 2022-06-20 NOTE — TELEPHONE ENCOUNTER
Phone Number Called: [phone] (home)       Call outcome: Spoke to patient regarding message below.    Message: Spoke to patient she mention long time ago she had some antibiotics prescribe for her cough she did not know what they were called I mention one that on her chart the dose pack and she said no so she wondering if she can get something prescribe for her cough or will she need a appointment?

## 2022-06-21 ENCOUNTER — OFFICE VISIT (OUTPATIENT)
Dept: MEDICAL GROUP | Facility: MEDICAL CENTER | Age: 62
End: 2022-06-21
Attending: PHYSICIAN ASSISTANT
Payer: MEDICAID

## 2022-06-21 VITALS
HEART RATE: 88 BPM | WEIGHT: 200.2 LBS | SYSTOLIC BLOOD PRESSURE: 138 MMHG | BODY MASS INDEX: 33.36 KG/M2 | HEIGHT: 65 IN | OXYGEN SATURATION: 96 % | TEMPERATURE: 96.8 F | DIASTOLIC BLOOD PRESSURE: 72 MMHG | RESPIRATION RATE: 17 BRPM

## 2022-06-21 DIAGNOSIS — G89.29 CHRONIC MIDLINE LOW BACK PAIN WITHOUT SCIATICA: ICD-10-CM

## 2022-06-21 DIAGNOSIS — R06.02 SOB (SHORTNESS OF BREATH): ICD-10-CM

## 2022-06-21 DIAGNOSIS — R05.8 NON-PRODUCTIVE COUGH: ICD-10-CM

## 2022-06-21 DIAGNOSIS — M54.50 CHRONIC MIDLINE LOW BACK PAIN WITHOUT SCIATICA: ICD-10-CM

## 2022-06-21 PROCEDURE — 99213 OFFICE O/P EST LOW 20 MIN: CPT | Performed by: PHYSICIAN ASSISTANT

## 2022-06-21 RX ORDER — MELOXICAM 7.5 MG/1
7.5 TABLET ORAL
Qty: 30 TABLET | Refills: 5 | Status: SHIPPED | OUTPATIENT
Start: 2022-06-21

## 2022-06-21 RX ORDER — METHYLPREDNISOLONE 4 MG/1
TABLET ORAL
Qty: 21 TABLET | Refills: 0 | Status: SHIPPED | OUTPATIENT
Start: 2022-06-21 | End: 2023-09-20

## 2022-06-21 RX ORDER — BENZONATATE 200 MG/1
200 CAPSULE ORAL 3 TIMES DAILY PRN
Qty: 60 CAPSULE | Refills: 0 | Status: SHIPPED | OUTPATIENT
Start: 2022-06-21 | End: 2023-10-31

## 2022-06-21 RX ORDER — METHOCARBAMOL 500 MG/1
500 TABLET, FILM COATED ORAL 4 TIMES DAILY
Qty: 56 TABLET | Refills: 0 | Status: SHIPPED | OUTPATIENT
Start: 2022-06-21 | End: 2022-07-05

## 2022-06-21 NOTE — PROGRESS NOTES
"Chief Complaint   Patient presents with   • Follow-Up     Subjective:     HPI:   Gita Pack is a 61 y.o. female here to discuss the evaluation and management of:    Non-productive cough  iGta presents today for follow up. She reports that her cough back in April never really went away. She was taking the medrol dose pack and Z pack but reports that she \"messed up\" the regimen for the medication so she didn't end up finishing the entire course. She reports that she was bummed because it was working for her. She reports that the cough has continued and is slowly worsening. She has been coughing so hard that her rib cage is causing her some pain. She presents today for further evaluation.     Chronic midline low back pain without sciatica  Gita presents today for follow up. She reports continued chronic low back pain. She has found that when she doesn't sit up straight her back starts bothering her. It worsens when standing for long periods of time. She has started back in her at home PT regimen in which she is doing exercises 1-2 times a week. She has been on Meloxicam for her hip but reports she has had good back pain relief with this as well. She denies any red flag signs.     ROS  See HPI.     No Known Allergies    Current medicines (including changes today)  Current Outpatient Medications   Medication Sig Dispense Refill   • methylPREDNISolone (MEDROL DOSEPAK) 4 MG Tablet Therapy Pack Follow package instructions. 21 Tablet 0   • benzonatate (TESSALON) 200 MG capsule Take 1 Capsule by mouth 3 times a day as needed for Cough. 60 Capsule 0   • methocarbamol (ROBAXIN) 500 MG Tab Take 1 Tablet by mouth 4 times a day for 14 days. 56 Tablet 0   • meloxicam (MOBIC) 7.5 MG Tab Take 1 Tablet by mouth every day. 30 Tablet 5   • atorvastatin (LIPITOR) 40 MG Tab TAKE 1 TABLET BY MOUTH EVERY DAY 30 Tablet 8   • lisinopril (PRINIVIL) 20 MG Tab Take 1 tablet by mouth once daily 30 Tablet 5   • estradiol (ESTRACE) 0.1 " MG/GM vaginal cream Insert 0.4 g into the vagina every 3 days. 42.5 g 3   • gabapentin (NEURONTIN) 300 MG Cap TAKE 1 CAPSULE BY MOUTH THREE TIMES A DAY 90 capsule 5   • mupirocin (BACTROBAN) 2 % Ointment mupirocin 2 % topical ointment     • nicotine (NICODERM) 7 MG/24HR PATCH 24 HR NICOTINE 7 MG/24HR PT24     • sulfamethoxazole-trimethoprim (BACTRIM DS) 800-160 MG tablet sulfamethoxazole 800 mg-trimethoprim 160 mg tablet     • nicotine polacrilex (NICORETTE) 2 MG Gum nicotine (polacrilex) 2 mg gum     • SODIUM FLUORIDE, DENTAL GEL, 1.1 % Gel PreviDent 5000 Booster Plus 1.1 % dental paste   USE A PEA SIZED AMOUNT OF TOOTHPASTE WHEN BRUSHING -USE 2X DAILY AM AND PM       No current facility-administered medications for this visit.       Social History     Tobacco Use   • Smoking status: Current Every Day Smoker     Packs/day: 0.50     Years: 40.00     Pack years: 20.00     Types: Cigarettes   • Smokeless tobacco: Never Used   • Tobacco comment: Pt states smokes about 1 pack a day    Vaping Use   • Vaping Use: Never used   Substance Use Topics   • Alcohol use: Yes     Comment: 3 drinks (mixed drinks with vodka)/day in evening    • Drug use: No     Patient Active Problem List    Diagnosis Date Noted   • Chronic midline low back pain without sciatica 06/21/2022   • Non-productive cough 04/14/2022   • Closed nondisplaced fracture of distal phalanx of right great toe with routine healing 03/24/2022   • Primary osteoarthritis of right hip 08/09/2021   • Essential hypertension 04/06/2021   • Primary osteoarthritis of left hip 10/19/2020   • Fatty liver 03/06/2020   • Neuropathy 01/03/2020   • Pain in left hip 01/03/2020   • Alcohol abuse 11/25/2019   • Prediabetes 11/25/2019   • Hyperlipidemia 11/11/2019   • Smoking 11/11/2019   • Neck pain 01/31/2019     Family History   Problem Relation Age of Onset   • Hyperlipidemia Mother    • Diabetes Mother    • Diabetes Father    • Hyperlipidemia Father    • Heart Disease Father    •  "Cancer Maternal Grandmother    • Hyperlipidemia Maternal Grandmother    • No Known Problems Sister       Objective:     /72 (BP Location: Left arm, Patient Position: Sitting, BP Cuff Size: Adult)   Pulse 88   Temp 36 °C (96.8 °F) (Skin)   Resp 17   Ht 1.651 m (5' 5\")   Wt 90.8 kg (200 lb 3.2 oz)   SpO2 96%  Body mass index is 33.32 kg/m².    Physical Exam:  Physical Exam  Vitals reviewed.   Constitutional:       Appearance: Normal appearance.   Eyes:      Pupils: Pupils are equal, round, and reactive to light.   Cardiovascular:      Rate and Rhythm: Normal rate and regular rhythm.      Heart sounds: Normal heart sounds.   Pulmonary:      Effort: Pulmonary effort is normal.      Breath sounds: Normal breath sounds.   Neurological:      General: No focal deficit present.      Mental Status: She is alert and oriented to person, place, and time.   Psychiatric:         Mood and Affect: Mood normal.         Behavior: Behavior normal.         Judgment: Judgment normal.         Assessment and Plan:     The following treatment plan was discussed:    1. Non-productive cough  - This is a chronic condition.  - Plan: Start on medrol dose pack and benzonatate. Patient has been educated on the use and potential side effect profile of these medications.   - methylPREDNISolone (MEDROL DOSEPAK) 4 MG Tablet Therapy Pack; Follow package instructions.  Dispense: 21 Tablet; Refill: 0  - benzonatate (TESSALON) 200 MG capsule; Take 1 Capsule by mouth 3 times a day as needed for Cough.  Dispense: 60 Capsule; Refill: 0    2. Chronic midline low back pain without sciatica  - This is a chronic condition.  - Plan: Trial a muscle relaxer before bedtime. Instructed not to start back on meloxicam until she completes the medrol dose pack for her persistent cough. Increase at home PT to at least 3 x weekly. Be cautious of poor posture and be adament about correcting poor posture. Encouraged exercise such as pilates to help build strength " in general.   - methocarbamol (ROBAXIN) 500 MG Tab; Take 1 Tablet by mouth 4 times a day for 14 days.  Dispense: 56 Tablet; Refill: 0  - meloxicam (MOBIC) 7.5 MG Tab; Take 1 Tablet by mouth every day.  Dispense: 30 Tablet; Refill: 5    3. SOB (shortness of breath)  - methylPREDNISolone (MEDROL DOSEPAK) 4 MG Tablet Therapy Pack; Follow package instructions.  Dispense: 21 Tablet; Refill: 0     Any change or worsening of signs or symptoms, patient encouraged to follow-up or report to emergency room for further evaluation. Patient verbalizes understanding and agrees.    Follow-Up: Return if symptoms worsen or fail to improve.      PLEASE NOTE: This dictation was created using voice recognition software. I have made every reasonable attempt to correct obvious errors, but I expect that there are errors of grammar and possibly content that I did not discover before finalizing the note.

## 2022-06-22 NOTE — ASSESSMENT & PLAN NOTE
"Gita presents today for follow up. She reports that her cough back in April never really went away. She was taking the medrol dose pack and Z pack but reports that she \"messed up\" the regimen for the medication so she didn't end up finishing the entire course. She reports that she was bummed because it was working for her. She reports that the cough has continued and is slowly worsening. She has been coughing so hard that her rib cage is causing her some pain. She presents today for further evaluation.   "

## 2022-06-22 NOTE — ASSESSMENT & PLAN NOTE
Gita presents today for follow up. She reports continued chronic low back pain. She has found that when she doesn't sit up straight her back starts bothering her. It worsens when standing for long periods of time. She has started back in her at home PT regimen in which she is doing exercises 1-2 times a week. She has been on Meloxicam for her hip but reports she has had good back pain relief with this as well. She denies any red flag signs.

## 2022-06-24 ENCOUNTER — HOSPITAL ENCOUNTER (OUTPATIENT)
Dept: PULMONOLOGY | Facility: MEDICAL CENTER | Age: 62
End: 2022-06-24
Attending: PHYSICIAN ASSISTANT
Payer: MEDICAID

## 2022-06-24 PROCEDURE — 94729 DIFFUSING CAPACITY: CPT | Mod: 26 | Performed by: INTERNAL MEDICINE

## 2022-06-24 PROCEDURE — 94726 PLETHYSMOGRAPHY LUNG VOLUMES: CPT

## 2022-06-24 PROCEDURE — 94060 EVALUATION OF WHEEZING: CPT

## 2022-06-24 PROCEDURE — 94726 PLETHYSMOGRAPHY LUNG VOLUMES: CPT | Mod: 26 | Performed by: INTERNAL MEDICINE

## 2022-06-24 PROCEDURE — 94060 EVALUATION OF WHEEZING: CPT | Mod: 26 | Performed by: INTERNAL MEDICINE

## 2022-06-24 PROCEDURE — 94729 DIFFUSING CAPACITY: CPT

## 2022-06-24 RX ORDER — ALBUTEROL SULFATE 2.5 MG/3ML
2.5 SOLUTION RESPIRATORY (INHALATION)
Status: DISCONTINUED | OUTPATIENT
Start: 2022-06-24 | End: 2022-06-25 | Stop reason: HOSPADM

## 2022-06-24 RX ADMIN — ALBUTEROL SULFATE 2.5 MG: 2.5 SOLUTION RESPIRATORY (INHALATION) at 12:15

## 2022-06-24 ASSESSMENT — PULMONARY FUNCTION TESTS
FVC: 3.59
FEV1_PERCENT_CHANGE: 2
FVC: 3.7
FEV1/FVC_PREDICTED: 79
FEV1/FVC: 73
FEV1_LLN: 2.13
FEV1: 2.61
FEV1/FVC_PERCENT_PREDICTED: 93
FEV1/FVC_PERCENT_CHANGE: 0
FEV1/FVC_PERCENT_PREDICTED: 78
FEV1/FVC_PERCENT_PREDICTED: 91
FEV1/FVC: 72
FEV1_PERCENT_CHANGE: 3
FEV1/FVC_PERCENT_PREDICTED: 92
FEV1/FVC_PERCENT_LLN: 66
FVC_PREDICTED: 3.25
FVC_PERCENT_PREDICTED: 113
FVC_PERCENT_PREDICTED: 110
FEV1/FVC: 73
FVC_LLN: 2.71
FEV1: 2.68
FEV1_PREDICTED: 2.55
FEV1/FVC: 72.43
FEV1_PERCENT_PREDICTED: 102
FEV1/FVC_PERCENT_PREDICTED: 92
FEV1/FVC_PERCENT_CHANGE: 67
FEV1_LLN: 2.13
FVC_LLN: 2.71
FEV1/FVC_PERCENT_LLN: 66
FEV1_PERCENT_PREDICTED: 104

## 2022-06-26 NOTE — PROCEDURES
DATE OF SERVICE:  06/24/2022     PULMONARY FUNCTION TEST INTERPRETATION     Normal pre and post bronchodilator spirometry with normal DLCO.  Full lung volume testing suggestive of air trapping.  This may be seen in a post-viral or post-exposure airway syndrome and can also be seen with active smoking. If the patient is a smoker,  them to quit smoking and consider repeat testing as clinically indicated.         ______________________________  DO ALDA STANFORD/ZEKE    DD:  06/25/2022 18:03  DT:  06/25/2022 20:30    Job#:  [id number]

## 2022-06-28 ENCOUNTER — TELEPHONE (OUTPATIENT)
Dept: MEDICAL GROUP | Facility: MEDICAL CENTER | Age: 62
End: 2022-06-28
Payer: MEDICAID

## 2022-06-28 NOTE — TELEPHONE ENCOUNTER
Phone Number Called: [phone] (home)       Call outcome: Spoke to patient regarding message below.    Message: Patient has been infromed and understood and also she mention that the steroids you put her on have been helping her a lot and she had no following questions. ----- Message from Jennifer Grayson P.A.-C. sent at 6/27/2022  9:24 AM PDT -----  Please call the patient and inform her that her pulmonary function test results showed evidence of air trapping, which occurs in COPD however, based on these results she does not have diagnostic COPD. Her results were most likely secondary to the fact that she is a smoker. If she continues to smoke she will likely develop diagnostic COPD.

## 2022-11-11 DIAGNOSIS — M16.12 PRIMARY OSTEOARTHRITIS OF LEFT HIP: ICD-10-CM

## 2022-11-11 DIAGNOSIS — M54.32 LEFT SIDED SCIATICA: ICD-10-CM

## 2022-11-15 RX ORDER — GABAPENTIN 300 MG/1
300 CAPSULE ORAL 3 TIMES DAILY
Qty: 90 CAPSULE | Refills: 0 | Status: SHIPPED | OUTPATIENT
Start: 2022-11-15

## 2022-12-07 DIAGNOSIS — E78.49 OTHER HYPERLIPIDEMIA: ICD-10-CM

## 2022-12-22 RX ORDER — ATORVASTATIN CALCIUM 40 MG/1
40 TABLET, FILM COATED ORAL
Qty: 30 TABLET | Refills: 5 | Status: SHIPPED | OUTPATIENT
Start: 2022-12-22

## 2023-04-07 DIAGNOSIS — I10 ESSENTIAL HYPERTENSION: ICD-10-CM

## 2023-04-07 RX ORDER — LISINOPRIL 20 MG/1
20 TABLET ORAL DAILY
Qty: 30 TABLET | Refills: 0 | Status: SHIPPED | OUTPATIENT
Start: 2023-04-07

## 2023-04-17 ENCOUNTER — HOSPITAL ENCOUNTER (OUTPATIENT)
Dept: RADIOLOGY | Facility: MEDICAL CENTER | Age: 63
End: 2023-04-17
Payer: COMMERCIAL

## 2023-04-17 DIAGNOSIS — Z12.31 VISIT FOR SCREENING MAMMOGRAM: ICD-10-CM

## 2023-04-17 PROCEDURE — 77063 BREAST TOMOSYNTHESIS BI: CPT

## 2023-09-19 ENCOUNTER — OFFICE VISIT (OUTPATIENT)
Dept: INTERNAL MEDICINE | Facility: OTHER | Age: 63
End: 2023-09-19
Payer: COMMERCIAL

## 2023-09-19 VITALS
OXYGEN SATURATION: 95 % | SYSTOLIC BLOOD PRESSURE: 99 MMHG | HEART RATE: 84 BPM | TEMPERATURE: 97.6 F | HEIGHT: 64 IN | DIASTOLIC BLOOD PRESSURE: 60 MMHG | BODY MASS INDEX: 31.45 KG/M2 | WEIGHT: 184.2 LBS

## 2023-09-19 DIAGNOSIS — Z78.9 ALCOHOL USE: ICD-10-CM

## 2023-09-19 DIAGNOSIS — N30.00 ACUTE CYSTITIS WITHOUT HEMATURIA: ICD-10-CM

## 2023-09-19 DIAGNOSIS — F10.920 ALCOHOLIC INTOXICATION WITHOUT COMPLICATION (HCC): ICD-10-CM

## 2023-09-19 LAB
APPEARANCE UR: ABNORMAL
BILIRUB UR STRIP-MCNC: ABNORMAL MG/DL
COLOR UR AUTO: YELLOW
GLUCOSE UR STRIP.AUTO-MCNC: NEGATIVE MG/DL
KETONES UR STRIP.AUTO-MCNC: ABNORMAL MG/DL
LEUKOCYTE ESTERASE UR QL STRIP.AUTO: ABNORMAL
NITRITE UR QL STRIP.AUTO: POSITIVE
PH UR STRIP.AUTO: 5.5 [PH] (ref 5–8)
PROT UR QL STRIP: 100 MG/DL
RBC UR QL AUTO: ABNORMAL
SP GR UR STRIP.AUTO: 1.03
UROBILINOGEN UR STRIP-MCNC: 0.2 MG/DL

## 2023-09-19 PROCEDURE — 3074F SYST BP LT 130 MM HG: CPT | Mod: GC

## 2023-09-19 PROCEDURE — 3078F DIAST BP <80 MM HG: CPT | Mod: GC

## 2023-09-19 PROCEDURE — 99204 OFFICE O/P NEW MOD 45 MIN: CPT | Mod: GC

## 2023-09-19 PROCEDURE — 81002 URINALYSIS NONAUTO W/O SCOPE: CPT

## 2023-09-19 RX ORDER — SULFAMETHOXAZOLE AND TRIMETHOPRIM 800; 160 MG/1; MG/1
1 TABLET ORAL 2 TIMES DAILY
Qty: 14 TABLET | Refills: 0 | Status: SHIPPED | OUTPATIENT
Start: 2023-09-19 | End: 2023-09-26

## 2023-09-19 ASSESSMENT — PATIENT HEALTH QUESTIONNAIRE - PHQ9: CLINICAL INTERPRETATION OF PHQ2 SCORE: 0

## 2023-09-19 NOTE — PATIENT INSTRUCTIONS
Start taking the antibiotic Bactrim twice a day  Get the labs as soon as possible  If you have fever, chills, extreme fatigue and sweaty, new back pain, lightheadedness, you neet to go to the emergency room

## 2023-09-19 NOTE — PROGRESS NOTES
Office Visit Initial Encounter    Chief Complaint   Patient presents with    New Patient     New patient here for possible uti       HPI   PMH of prediabetes, hyperlipidemia, vitamin D deficiency, hypertension, tobacco use disorder, cataracts, alcohol use disorder, presents today with complaint of increased urinary frequency and dysuria, started 2 weeks ago and is progressively getting worse.  Patient states she has a primary doctor in another facility that she has not desiring to establish for care in this clinic.  Patient does not have dysuria, new back pain, fever or chills.  States she has been feeling sweaty and a little tired, but no more than usual.  Patient had an alcoholic drink just before coming to the visit, which is her normal from 2-3 drinks per day.     Past Medical History  Past Medical History:   Diagnosis Date    Arthritis     Diabetes ([facility])     pre-diabetic    Head ache     Hypercholesterolemia     Hypertension     Kidney disease     Prediabetes     Seizure ([facility])     hx of Epilepsy    Substance abuse ([facility])     alcoholic       Allergies:     Patient has no known allergies.    Medications    Current Outpatient Medications:     sulfamethoxazole-trimethoprim, 1 Tablet, Oral, BID    lisinopril, 20 mg, Oral, DAILY, Taking    atorvastatin, 40 mg, Oral, QDAY, Taking    gabapentin, 300 mg, Oral, TID, Taking    meloxicam, 7.5 mg, Oral, QDAY, Taking    SODIUM FLUORIDE (DENTAL GEL), PreviDent 5000 Booster Plus 1.1 % dental paste  USE A PEA SIZED AMOUNT OF TOOTHPASTE WHEN BRUSHING -USE 2X DAILY AM AND PM, Taking    methylPREDNISolone, Follow package instructions. (Patient not taking: Reported on 9/19/2023), Unknown    benzonatate, 200 mg, Oral, TID PRN    estradiol, 0.4 g, Vaginal, Q3 DAYS (Patient not taking: Reported on 9/19/2023), Not Taking    mupirocin, mupirocin 2 % topical ointment (Patient not taking: Reported on 9/19/2023), Not Taking    nicotine polacrilex, nicotine (polacrilex) 2 mg gum  (Patient not taking: Reported on 9/19/2023), Not Taking    Family History  Family History   Problem Relation Age of Onset    Hyperlipidemia Mother     Diabetes Mother     Diabetes Father     Hyperlipidemia Father     Heart Disease Father     Cancer Maternal Grandmother     Hyperlipidemia Maternal Grandmother     No Known Problems Sister        Surgical History  Past Surgical History:   Procedure Laterality Date    MI TOTAL HIP ARTHROPLASTY Right 9/17/2021    Procedure: ARTHROPLASTY, HIP, TOTAL, ANTERIOR APPROACH;  Surgeon: James Gayle M.D.;  Location: [facility];  Service: Orthopedics    CYSTOSCOPY  5/29/08    Performed by JAHAIRA HOLLINGSWORTH at [facility]    ABDOMINAL HYSTERECTOMY TOTAL  5/29/08    Performed by JAHAIRA HOLLINGSWORTH at [facility]    MOLE EXCISION  1985    vaginal area    BREAST BIOPSY      hx of right benign breast biopsy    TONSILLECTOMY         Social History   Social History     Tobacco Use    Smoking status: Every Day     Current packs/day: 0.50     Average packs/day: 0.5 packs/day for 40.0 years (20.0 ttl pk-yrs)     Types: Cigarettes    Smokeless tobacco: Never    Tobacco comments:     Pt states smokes about 1 pack a day    Vaping Use    Vaping Use: Never used   Substance Use Topics    Alcohol use: Yes     Comment: 3 drinks (mixed drinks with vodka)/day in evening     Drug use: No       ROS     Pulmonary: No new or worsening shortness of breath, cough, sputum, no hemoptysis.  Cardiovascular: No chest pain, palpitations, or LE swelling.   : No dysuria, frequency, retention or hematuria.   Neuro: No headaches, no lightheadedness, no dizziness. No focal weakness, no general weakness. No gait disturbances.  Was reinforced in the setting of suspected alcohol intoxication.    Physical Exam     BP 99/60 (BP Location: Right arm, Patient Position: Sitting, BP Cuff Size: Adult) Comment: repeated bp 5 mins  Pulse 84   Temp 36.4 °C (97.6 °F)  "(Temporal)   Ht 1.631 m (5' 4.2\")   Wt 83.6 kg (184 lb 3.2 oz)   SpO2 95%   BMI 31.42 kg/m²       General: Not in acute distress, but poor shape, poor hygiene, sleepy during the visit, diaphoretic.  Head and Neck: NC/AT, EOMI, no scleral icterus or conjunctival pallor.  CV: Rhythmic heart sounds, no murmurs, gallops or rubs. No S3,S4 or JVD.   Pulm: Chest expansion is symmetrical, no crackles, rales, rhonchi, or wheezing.  GI: Flat, non distended, soft, non tender.  No suprapubic tenderness, negative CVA tenderness.  MSK: Normal ROM. No lower extremity edema. No lesions.   Neuro: Patient is alert and oriented x3.  Speech is slurred.  Patient is a irrtable and inpatient.  Gait and movements are slow.  Psych: Appropriate mood and affect.     Diagnostic tests  POCT urinalysis has large leukocyte esterase, nitrates and protein.    Note: I have reviewed all pertinent labs and diagnostic tests associated with this visit with specific comments listed under the assessment and plan below    Assessment and Plan    UTI  Alcohol Intoxication - Alcohol use disorders  Given that patient is diaphoretic, pressure is borderline low, and blood pressure is considered low for her normal values, there is concern for SIRS/Sepsis.  However, this can be confounded by suspected alcohol intoxication.  We explained that ideally she should be evaluated at an ER and/or obtain immediate labs, which patient declined and she also declined the offer for transportation services given her current state.   POCT urinalysis has large leukocyte esterase, nitrates and protein.  -Patient is started on Bactrim twice a day for 7 days  -Warning signs for seeking urgent care were clearly explained after patient declined further assessment in the immediate future  -Patient states she would come for reevaluation in 1 week, but does not desire to come to this clinic for full care    Return in about 1 week (around 9/26/2023).    Zahira RAMIREZ" PGY-2  Internal Medicine [employer]    Pt has been seen and discussed with the Attending Physician

## 2023-09-26 ENCOUNTER — APPOINTMENT (OUTPATIENT)
Dept: INTERNAL MEDICINE | Facility: OTHER | Age: 63
End: 2023-09-26
Payer: COMMERCIAL

## 2023-10-31 ENCOUNTER — OFFICE VISIT (OUTPATIENT)
Dept: INTERNAL MEDICINE | Facility: OTHER | Age: 63
End: 2023-10-31
Payer: COMMERCIAL

## 2023-10-31 VITALS
WEIGHT: 190 LBS | HEART RATE: 89 BPM | DIASTOLIC BLOOD PRESSURE: 78 MMHG | HEIGHT: 64 IN | SYSTOLIC BLOOD PRESSURE: 139 MMHG | TEMPERATURE: 97.2 F | BODY MASS INDEX: 32.44 KG/M2 | OXYGEN SATURATION: 97 %

## 2023-10-31 DIAGNOSIS — Z23 INFLUENZA VACCINATION ADMINISTERED AT CURRENT VISIT: ICD-10-CM

## 2023-10-31 DIAGNOSIS — Z23 NEED FOR SHINGLES VACCINE: ICD-10-CM

## 2023-10-31 DIAGNOSIS — F17.200 HAS BEEN SMOKING TOBACCO FOR 30 YEARS OR MORE: ICD-10-CM

## 2023-10-31 DIAGNOSIS — Z11.59 ENCOUNTER FOR HEPATITIS C SCREENING TEST FOR LOW RISK PATIENT: ICD-10-CM

## 2023-10-31 DIAGNOSIS — G62.9 NEUROPATHY: ICD-10-CM

## 2023-10-31 DIAGNOSIS — Z87.891 PERSONAL HISTORY OF NICOTINE DEPENDENCE: ICD-10-CM

## 2023-10-31 DIAGNOSIS — Z12.2 ENCOUNTER FOR SCREENING FOR LUNG CANCER: ICD-10-CM

## 2023-10-31 DIAGNOSIS — E78.49 OTHER HYPERLIPIDEMIA: ICD-10-CM

## 2023-10-31 DIAGNOSIS — F17.200 SMOKING: ICD-10-CM

## 2023-10-31 DIAGNOSIS — L98.9 SKIN LESION OF FACE: ICD-10-CM

## 2023-10-31 DIAGNOSIS — R73.03 PREDIABETES: ICD-10-CM

## 2023-10-31 DIAGNOSIS — F17.200 TOBACCO USE DISORDER: ICD-10-CM

## 2023-10-31 PROCEDURE — 90686 IIV4 VACC NO PRSV 0.5 ML IM: CPT | Performed by: INTERNAL MEDICINE

## 2023-10-31 PROCEDURE — 99214 OFFICE O/P EST MOD 30 MIN: CPT | Mod: 25,GC | Performed by: INTERNAL MEDICINE

## 2023-10-31 PROCEDURE — 3078F DIAST BP <80 MM HG: CPT | Performed by: INTERNAL MEDICINE

## 2023-10-31 PROCEDURE — 3075F SYST BP GE 130 - 139MM HG: CPT | Performed by: INTERNAL MEDICINE

## 2023-10-31 PROCEDURE — G0008 ADMIN INFLUENZA VIRUS VAC: HCPCS | Performed by: INTERNAL MEDICINE

## 2023-10-31 RX ORDER — MULTIVIT-MIN/IRON/FOLIC ACID/K 18-600-40
1000 CAPSULE ORAL DAILY
Qty: 60 TABLET | Refills: 2 | Status: SHIPPED | OUTPATIENT
Start: 2023-10-31 | End: 2023-11-30 | Stop reason: SDUPTHER

## 2023-10-31 RX ORDER — VARENICLINE TARTRATE 0.5 (11)-1
1 KIT ORAL DAILY
Qty: 53 EACH | Refills: 0 | Status: SHIPPED | OUTPATIENT
Start: 2023-10-31 | End: 2023-12-04 | Stop reason: SDUPTHER

## 2023-10-31 NOTE — PROGRESS NOTES
"      Office Visit Follow up    Chief Complaint   Patient presents with    Referral Needed     Patient here for facial cyst referral to dermatologist       Subjective   Pt states that she has had a lesion on the right side of her face which has been slowly enlarging over the past 1 year. There is no associated pain, itching or rash. There is a similar lesion on the L side of her face as well. She denies F/C/S, recent infections, or weight loss. She has not tried any tx yet.       ROS   -states she is intermittently noncompliant with her statin    /78 (BP Location: Right arm, Patient Position: Sitting, BP Cuff Size: Adult long)   Pulse 89   Temp 36.2 °C (97.2 °F) (Temporal)   Ht 1.631 m (5' 4.2\")   Wt 86.2 kg (190 lb)   SpO2 97%   BMI 32.41 kg/m²     Physical Exam   -General: NAD, converses well  -Cardio: no murmurs or gallops  -Resp: lungs CTAB, symmetric expansion  -Abd: soft and nontender, no guarding or rebound  -Skin: there is a soft 1cm fluctuant lesion on the R cheek with no erythema/induration or tenderness.    Data    -TSH = wnl  -ASCVD = 13.9%, LDL = 91 --> 154  -A1c = 6.2%   -Vit D = 29       Note: I have reviewed all pertinent labs and diagnostic tests associated with this visit with specific comments listed under the assessment and plan below    Assessment and Plan  Gita Pack is a 63F with a h/o Endometriosis (s/p Ex lap in 2008), h/o Childhood Epilepsy (but not currently requiring Phenytoin), HTN, Hepatic Steatosis, OA, preDM2 (A1c = 6.2%), HLD, Chicken Pox in Childhood, R hip replacement (in 2021 and 2022), Hysterectomy (1985), smoking (0.5 ppd x40 years), ETOH use.     She presented with a R face lesion. Derm recs/CBC/CMP/CRP/CT Chest for Lung CA screening/lipid panel/colonoscopy/Hep C/Vitamin D levels = " pending.    -----------------------------------------------------------------------------------------------------------------------------------------------------------------------------------------------------------  #R Face Skin lesion  (Etiology uncertain. However, physical exam is suggestive of a carbuncle)  -Derm recs = pending    #unspecified Neuropathy?  -plan to address on a future visit  -c/w Gabapentin for now     #HLD  (ASCVD score = 13.9%)  -LDL = 91 --> 154  -c/w Atorvastatin 40mg  -encouraged statin compliance     #HTN  -c/w Lisinopril    #preDM2  (A1c = 6.2%)  -c/w Metformin for now (can readdress at a future visit)  -next A1c due in December 2023    #OA  -c/w PRN Meloxicam    #Smoking  -Varenicline (started 10/31/23)    #GERD  -c/w PPI    #Preventative Health Care  -COVID vaccine = declined  -Flu vaccine = obtained  -next A1c = due ~12/2023  -next Lipid panel = pending  -next colonoscopy = pending  -next TD booster = due ~2028  -shingles vaccine = recommended  -PCV Vaccine = due ~2025  -pt is not a vegetarian  -next PAP = due NOW  -next Mammogram = due NOW  -DEXA scan = due ~2025  -HIV x1 = done  -Hep C x1 = pending      Code Status = Full Code    Followup: 5 weeks      Max Joseph, PGY3  [facility]    Pt has been seen and discussed with the Attending Physician.

## 2023-11-01 ENCOUNTER — TELEPHONE (OUTPATIENT)
Dept: SLEEP MEDICINE | Facility: MEDICAL CENTER | Age: 63
End: 2023-11-01
Payer: COMMERCIAL

## 2023-11-01 NOTE — TELEPHONE ENCOUNTER
1. Age 50-77 yrs of age? 63 yrs    2. Total Years Smoking cigarettes? 47 yrs    3. 20 pack year hx of smoking, or greater (average packs per day)?   46.25 pk yrs - 45.5 yrs @ 1pk/day + 1.5 yrs @ 0.5 pk/day     4. Current smoker or if quit, has pt quit within last 15 yrs?  Current     5. Completed Lung Cancer screen CT with [facility] previously?  NONE    6. Anything noted on previous CT involving lungs? (Nodules, Mass, Etc.)  NONE    7. Any signs or symptoms of lung cancer? ( New / change to Cough, Wheezing, S.O.B., coughing up blood, unexplained weight loss within last year)?   NONE    8. Previous history of lung cancer?   NONE

## 2023-11-02 ENCOUNTER — TELEPHONE (OUTPATIENT)
Dept: INTERNAL MEDICINE | Facility: OTHER | Age: 63
End: 2023-11-02
Payer: COMMERCIAL

## 2023-11-02 RX ORDER — OMEPRAZOLE 20 MG/1
20 CAPSULE, DELAYED RELEASE ORAL DAILY
COMMUNITY
End: 2023-12-04

## 2023-11-02 NOTE — TELEPHONE ENCOUNTER
Pt states she needed to call back and give Dr Joseph info:    She was taking Omeprazole 20 mg and also the metformin was 850 mg

## 2023-11-15 ENCOUNTER — APPOINTMENT (OUTPATIENT)
Dept: LAB | Facility: MEDICAL CENTER | Age: 63
End: 2023-11-15
Payer: COMMERCIAL

## 2023-11-30 RX ORDER — MULTIVIT-MIN/IRON/FOLIC ACID/K 18-600-40
1000 CAPSULE ORAL DAILY
Qty: 60 TABLET | Refills: 2 | Status: SHIPPED | OUTPATIENT
Start: 2023-11-30

## 2023-11-30 NOTE — TELEPHONE ENCOUNTER
Phone Number Called: [phone]    Call outcome: Spoke to patient regarding message below.    Message: Pt states she needs the refill for Vitamin D because [facility] is on backorder with that so she cannot get the prescription even though it was sent in for 4 month supply. She would like it to be sent to the [facility] on [address] for Vitamin D and estradiol cream. In regards to the referral, informed pt that Dr. Joseph is not listed as her PCP on her insurance so he cannot refer her anywhere. She understood and said she dropped [facility] and is now with [facility]. She will call [facility] to see what is required for a derm referral.

## 2023-11-30 NOTE — TELEPHONE ENCOUNTER
Patient called and left 2 VM's at  asking for Vit. D rx sent to [facility] on [address]    She also states that her dermatology referral was never received at the office it was sent to, but when I checked where it was sent, it was never sent anywhere because it looks like Per Medicare, her PCP is not listed as Joseph, there for he can not refer her anywhere  How does patient proceed?

## 2023-12-04 ENCOUNTER — OFFICE VISIT (OUTPATIENT)
Dept: INTERNAL MEDICINE | Facility: OTHER | Age: 63
End: 2023-12-04
Payer: COMMERCIAL

## 2023-12-04 DIAGNOSIS — G62.9 NEUROPATHY: ICD-10-CM

## 2023-12-04 DIAGNOSIS — E55.9 VITAMIN D DEFICIENCY: ICD-10-CM

## 2023-12-04 DIAGNOSIS — R73.03 PREDIABETES: ICD-10-CM

## 2023-12-04 DIAGNOSIS — F17.200 SMOKING: ICD-10-CM

## 2023-12-04 DIAGNOSIS — L65.9 HAIR LOSS: ICD-10-CM

## 2023-12-04 DIAGNOSIS — L98.9 SKIN LESION OF FACE: ICD-10-CM

## 2023-12-04 PROCEDURE — 99214 OFFICE O/P EST MOD 30 MIN: CPT | Mod: GC | Performed by: STUDENT IN AN ORGANIZED HEALTH CARE EDUCATION/TRAINING PROGRAM

## 2023-12-04 RX ORDER — VARENICLINE TARTRATE 0.5 (11)-1
1 KIT ORAL DAILY
Qty: 53 EACH | Refills: 0 | Status: SHIPPED | OUTPATIENT
Start: 2023-12-04 | End: 2024-01-26

## 2023-12-04 NOTE — PROCEDURES
Office Visit Follow up    CC = follow up    Subjective   Pt states that 2 weeks ago she had a sudden onset of diffuse hair loss. She denies any associated itching or rashes. She does not eat her hair. She denies any hot flashes. She has not tried any tx yet for her sx. She denies any h/o similar sx. Of note, she did have recent travel to [place].     Pt states that she has had stable N/T in her fingertips with associated pain and itching x 2 years. She denies any similar sx in her feet. She denies any inciting events or trauma. She denies any weakness. She hasn't tried any tx yet for her sx.       ROS   -denies AVH. Denies behavioral changes  -she is compliant with her home statin     -T = 96.4  -HR = 92  -BP = 144/73  -SpO2 = 97%  -Wt = 192.2lb    Physical Exam   -General: NAD, converses well, positive hair pull test  -Cardio: no murmurs or gallops  -Resp: lungs CTAB, symmetric expansion  -Abd: soft and nontender, no guarding or rebound  -Neuro: CN VII intact. No pronator drift, no ataxia, EOMI    Data   -TSH = wnl  -ASCVD = 19.3%, LDL = 91 --> 154 --> 131  -A1c = 6.2%   -Vit D = 29         Note: I have reviewed all pertinent labs and diagnostic tests associated with this visit with specific comments listed under the assessment and plan below     Assessment and Plan  Gita Pack is a 63F with a h/o Endometriosis (s/p Ex lap in 2008), h/o Childhood Epilepsy (but not currently requiring Phenytoin), HTN, Hepatic Steatosis, OA, preDM2 (A1c = 6.2%), HLD, Chicken Pox in Childhood, R hip replacement (in 2021 and 2022), Hysterectomy (1985), smoking (0.5 ppd x40 years), ETOH use (3 cocktails/night).      She presented with hair loss. Derm recs/B1/B12/TSH/CBC/CMP/CRP/CT Chest for Lung CA screening/Vitamin D levels = pending.    "  -----------------------------------------------------------------------------------------------------------------------------------------------------------------------------------------------------------  #Neuropathy in hands + ?possible Behavioral changes  (Possibly secondary to ETOH use. Because pt seems to be slurring her speech and joked that she \"might have AVH\" there is some concern for Wernicke-Korsakoff Encephalopathy - although there is no evidence of ophthalmoplegia or ataxia. Other differentials include B12 deficiency as pt is on Metformin, and Hypothyroidism.)  -c/w Gabapentin for now   -encouraged cutting down on ETOH use  -w/u pending as above  -encouraged pt to bring in daughter for collateral history    #Hair Loss  (Etiology uncertain, but possibly secondary to post-menopausal hair loss)  -Derm recs = pending    #R Face Skin lesion  (Etiology uncertain. However, physical exam is suggestive of a carbuncle)  -Derm recs = pending    #HLD  (ASCVD score = 13.9%)  -LDL = 91 --> 154 --> 131  -c/w Atorvastatin 40mg  -encouraged statin compliance      #HTN  -c/w Lisinopril     #preDM2  (A1c = 6.2%)  -c/w Metformin for now (can readdress at a future visit)  -next A1c = pending     #OA  -c/w PRN Meloxicam     #Smoking  -Varenicline (started 12/04/23)     #Preventative Health Care  -COVID vaccine = declined  -Flu vaccine = obtained  -next A1c = pending  -next Lipid panel = due ~11/2024  -next colonoscopy = declined  -next TD booster = due ~2028  -shingles vaccine = obtained  -PCV Vaccine = due ~2025  -pt is not a vegetarian  -next PAP = not needed due to Hysterectomy  -next Mammogram = due 4/2024  -DEXA scan = due ~2025  -HIV x1 = done  -Hep C x1 = done        Code Status = Full Code     Followup: 5 weeks        Max Joseph, PGY3  [facility]     Pt has been seen and discussed with the Attending Physician.        "

## 2023-12-04 NOTE — PROCEDURES
Teaching Physician Attestation      Level of Participation    I discussed with the resident physician the patient's history, exam, assessment and plan in detail.  Topics listed in my addendum were the focus of the visit.  Healthcare maintenance was not addressed this visit unless listed as a topic in my addendum.  I agree with plan as written along with the following additions/modifications:    Possible peripheral neuropathy in the setting of elevated alcohol use  -Patient reports drinking 2-3 alcoholic beverages per night longstanding, reports 6 spontaneous onset of bilateral paresthesias right side greater than left and upper extremities, nothing in her feet.  No clear mention of cognitive changes although Dr. Pepper notes a change in her cognition between last visit and this 1.    Plan  -Check B12 level and A1c  -Have daughter come in next visit to discuss possible behavioral change  -Discussed ideal maximum of 1 alcoholic drink daily, offered to help patient with alcohol cessation if she is interested    Hair loss  -Positive hair pull test, otherwise scalp exam reported as benign.  Patient does bring a large clump of hair which she states came out in 1 episode of brushing.  Objectively she has what appears to be a normal head of hair, but will refer to dermatology for further evaluation, appreciate support    ? Behavioral change  -will ask daughter to come in next visit to confirm.  As above.  Patient denies noting any change.      Check A1c for prediabetes monitoring    Declines colonnoscopy.  Attempted to get ldct previously, would follow up future visit.

## 2023-12-06 ENCOUNTER — TELEPHONE (OUTPATIENT)
Dept: INTERNAL MEDICINE | Facility: OTHER | Age: 63
End: 2023-12-06
Payer: COMMERCIAL

## 2023-12-06 NOTE — PROGRESS NOTES
Teaching Physician Attestation      Level of Participation    I have personally interviewed and examined the patient.  In addition, I discussed with the resident physician the patient's history, exam, assessment and plan in detail.  Topics listed in my addendum were the focus of the visit.  Healthcare maintenance was not addressed this visit unless listed as a topic in my addendum.  I agree with the plan as written along with the following additions/modifications:       Possible peripheral neuropathy in the setting of elevated alcohol use  -Patient reports drinking 2-3 alcoholic beverages per night longstanding, reports 6 spontaneous onset of bilateral paresthesias right side greater than left and upper extremities, nothing in her feet.  No clear mention of cognitive changes although Dr. Pepper notes a change in her cognition between last visit and this 1.     Plan  -Check B12 level and A1c  -Have daughter come in next visit to discuss possible behavioral change  -Discussed ideal maximum of 1 alcoholic drink daily, offered to help patient with alcohol cessation if she is interested     Hair loss  -Positive hair pull test, otherwise scalp exam reported as benign.  Patient does bring a large clump of hair which she states came out in 1 episode of brushing.  Objectively she has what appears to be a normal head of hair, but will refer to dermatology for further evaluation, appreciate support     ? Behavioral change in setting of peripheral neuropathy, ? B12 deficiency  -will ask daughter to come in next visit to confirm.  As above.  Patient denies noting any change.        Check A1c for prediabetes monitoring     Declines colonnoscopy.  Attempted to get ldct previously, would follow up future visit.    Rtc 1 month.  Pcr.